# Patient Record
Sex: FEMALE | Race: WHITE | NOT HISPANIC OR LATINO | Employment: FULL TIME | URBAN - METROPOLITAN AREA
[De-identification: names, ages, dates, MRNs, and addresses within clinical notes are randomized per-mention and may not be internally consistent; named-entity substitution may affect disease eponyms.]

---

## 2017-01-13 ENCOUNTER — COMMUNICATION - HEALTHEAST (OUTPATIENT)
Dept: INTERNAL MEDICINE | Facility: CLINIC | Age: 72
End: 2017-01-13

## 2017-01-13 DIAGNOSIS — F32.A DEPRESSION: ICD-10-CM

## 2017-02-26 ENCOUNTER — COMMUNICATION - HEALTHEAST (OUTPATIENT)
Dept: INTERNAL MEDICINE | Facility: CLINIC | Age: 72
End: 2017-02-26

## 2017-05-25 ENCOUNTER — COMMUNICATION - HEALTHEAST (OUTPATIENT)
Dept: INTERNAL MEDICINE | Facility: CLINIC | Age: 72
End: 2017-05-25

## 2017-05-29 ENCOUNTER — COMMUNICATION - HEALTHEAST (OUTPATIENT)
Dept: INTERNAL MEDICINE | Facility: CLINIC | Age: 72
End: 2017-05-29

## 2017-06-06 ENCOUNTER — COMMUNICATION - HEALTHEAST (OUTPATIENT)
Dept: INTERNAL MEDICINE | Facility: CLINIC | Age: 72
End: 2017-06-06

## 2017-08-21 ENCOUNTER — RECORDS - HEALTHEAST (OUTPATIENT)
Dept: ADMINISTRATIVE | Facility: OTHER | Age: 72
End: 2017-08-21

## 2017-08-27 ENCOUNTER — COMMUNICATION - HEALTHEAST (OUTPATIENT)
Dept: INTERNAL MEDICINE | Facility: CLINIC | Age: 72
End: 2017-08-27

## 2017-09-19 ENCOUNTER — COMMUNICATION - HEALTHEAST (OUTPATIENT)
Dept: INTERNAL MEDICINE | Facility: CLINIC | Age: 72
End: 2017-09-19

## 2017-09-19 DIAGNOSIS — F32.A DEPRESSION: ICD-10-CM

## 2017-10-02 ENCOUNTER — AMBULATORY - HEALTHEAST (OUTPATIENT)
Dept: INTERNAL MEDICINE | Facility: CLINIC | Age: 72
End: 2017-10-02

## 2017-10-02 DIAGNOSIS — R06.09 DYSPNEA ON EXERTION: ICD-10-CM

## 2017-10-16 ENCOUNTER — COMMUNICATION - HEALTHEAST (OUTPATIENT)
Dept: INTERNAL MEDICINE | Facility: CLINIC | Age: 72
End: 2017-10-16

## 2017-10-16 ENCOUNTER — HOSPITAL ENCOUNTER (OUTPATIENT)
Dept: CARDIOLOGY | Facility: CLINIC | Age: 72
Discharge: HOME | End: 2017-10-16
Attending: INTERNAL MEDICINE

## 2017-10-16 DIAGNOSIS — R06.09 DYSPNEA ON EXERTION: ICD-10-CM

## 2017-10-16 DIAGNOSIS — R06.09 OTHER FORMS OF DYSPNEA: ICD-10-CM

## 2017-10-16 LAB
AORTIC ROOT: 2.8 CM
AR DECEL SLOPE: 1580 MM/S2
AR PEAK VELOCITY: 342 CM/S
AV REGURGITANT PEAK GRADIENT: 46.8 MMHG
AV REGURGITATION PRESSURE HALF TIME: 635 MS
BSA FOR ECHO PROCEDURE: 1.44 M2
CV BLOOD PRESSURE: NORMAL MMHG
CV ECHO HEIGHT: 57 IN
CV ECHO WEIGHT: 114 LBS
DOP CALC LVOT AREA: 2.83 CM2
DOP CALC LVOT DIAMETER: 1.9 CM
DOP CALC LVOT PEAK VEL: 112 CM/S
DOP CALC LVOT STROKE VOLUME: 61.8 CM3
DOP CALCLVOT PEAK VEL VTI: 21.8 CM
EJECTION FRACTION: 62 % (ref 55–75)
FRACTIONAL SHORTENING: 37.1 % (ref 28–44)
INTERVENTRICULAR SEPTUM IN END DIASTOLE: 1.2 CM (ref 0.6–0.9)
IVS/PW RATIO: 1.2
LA AREA 1: 16 CM2
LA AREA 2: 13.4 CM2
LEFT ATRIUM LENGTH: 4.26 CM
LEFT ATRIUM SIZE: 2.8 CM
LEFT ATRIUM TO AORTIC ROOT RATIO: 1 NO UNITS
LEFT ATRIUM VOLUME INDEX: 29.7 ML/M2
LEFT ATRIUM VOLUME: 42.8 CM3
LEFT VENTRICLE CARDIAC INDEX: 2.7 L/MIN/M2
LEFT VENTRICLE CARDIAC OUTPUT: 3.8 L/MIN
LEFT VENTRICLE DIASTOLIC VOLUME INDEX: 38.2 CM3/M2 (ref 34–74)
LEFT VENTRICLE DIASTOLIC VOLUME: 55 CM3 (ref 46–106)
LEFT VENTRICLE HEART RATE: 62 BPM
LEFT VENTRICLE MASS INDEX: 82.6 G/M2
LEFT VENTRICLE SYSTOLIC VOLUME INDEX: 14.6 CM3/M2 (ref 11–31)
LEFT VENTRICLE SYSTOLIC VOLUME: 21 CM3 (ref 14–42)
LEFT VENTRICULAR INTERNAL DIMENSION IN DIASTOLE: 3.5 CM (ref 3.8–5.2)
LEFT VENTRICULAR INTERNAL DIMENSION IN SYSTOLE: 2.2 CM (ref 2.2–3.5)
LEFT VENTRICULAR MASS: 119 G
LEFT VENTRICULAR OUTFLOW TRACT MEAN GRADIENT: 2 MMHG
LEFT VENTRICULAR OUTFLOW TRACT MEAN VELOCITY: 65.3 CM/S
LEFT VENTRICULAR OUTFLOW TRACT PEAK GRADIENT: 5 MMHG
LEFT VENTRICULAR POSTERIOR WALL IN END DIASTOLE: 1 CM (ref 0.6–0.9)
LV STROKE VOLUME INDEX: 42.9 ML/M2
MITRAL VALVE E/A RATIO: 0.7
MV AVERAGE E/E' RATIO: 9.2 CM/S
MV DECELERATION TIME: 197 MS
MV E'TISSUE VEL-LAT: 6.92 CM/S
MV E'TISSUE VEL-MED: 8.29 CM/S
MV LATERAL E/E' RATIO: 10.1
MV MEDIAL E/E' RATIO: 8.4
MV PEAK A VELOCITY: 107 CM/S
MV PEAK E VELOCITY: 69.6 CM/S
NUC REST DIASTOLIC VOLUME INDEX: 1824 LBS
NUC REST SYSTOLIC VOLUME INDEX: 57 IN
RIGHT VENTRICULAR INTERNAL DIMENSION IN DYSTOLE: 2.4 CM
TRICUSPID REGURGITATION PEAK PRESSURE GRADIENT: 16.6 MMHG
TRICUSPID VALVE ANULAR PLANE SYSTOLIC EXCURSION: 1.9 CM
TRICUSPID VALVE PEAK REGURGITANT VELOCITY: 204 CM/S

## 2017-10-16 ASSESSMENT — MIFFLIN-ST. JEOR: SCORE: 885.98

## 2017-10-22 ENCOUNTER — COMMUNICATION - HEALTHEAST (OUTPATIENT)
Dept: INTERNAL MEDICINE | Facility: CLINIC | Age: 72
End: 2017-10-22

## 2017-10-27 ENCOUNTER — OFFICE VISIT - HEALTHEAST (OUTPATIENT)
Dept: FAMILY MEDICINE | Facility: CLINIC | Age: 72
End: 2017-10-27

## 2017-10-27 DIAGNOSIS — M25.561 ACUTE PAIN OF RIGHT KNEE: ICD-10-CM

## 2017-10-28 ENCOUNTER — COMMUNICATION - HEALTHEAST (OUTPATIENT)
Dept: SCHEDULING | Facility: CLINIC | Age: 72
End: 2017-10-28

## 2017-11-12 ENCOUNTER — COMMUNICATION - HEALTHEAST (OUTPATIENT)
Dept: INTERNAL MEDICINE | Facility: CLINIC | Age: 72
End: 2017-11-12

## 2017-11-12 DIAGNOSIS — F32.A DEPRESSION: ICD-10-CM

## 2017-11-13 ENCOUNTER — COMMUNICATION - HEALTHEAST (OUTPATIENT)
Dept: INTERNAL MEDICINE | Facility: CLINIC | Age: 72
End: 2017-11-13

## 2017-12-04 ENCOUNTER — RECORDS - HEALTHEAST (OUTPATIENT)
Dept: ADMINISTRATIVE | Facility: OTHER | Age: 72
End: 2017-12-04

## 2017-12-05 ENCOUNTER — RECORDS - HEALTHEAST (OUTPATIENT)
Dept: ADMINISTRATIVE | Facility: OTHER | Age: 72
End: 2017-12-05
Payer: MEDICARE

## 2017-12-26 ENCOUNTER — RECORDS - HEALTHEAST (OUTPATIENT)
Dept: ADMINISTRATIVE | Facility: OTHER | Age: 72
End: 2017-12-26

## 2018-01-11 ENCOUNTER — COMMUNICATION - HEALTHEAST (OUTPATIENT)
Dept: INTERNAL MEDICINE | Facility: CLINIC | Age: 73
End: 2018-01-11

## 2018-01-17 ENCOUNTER — COMMUNICATION - HEALTHEAST (OUTPATIENT)
Dept: SCHEDULING | Facility: CLINIC | Age: 73
End: 2018-01-17

## 2018-01-18 ENCOUNTER — OFFICE VISIT - HEALTHEAST (OUTPATIENT)
Dept: INTERNAL MEDICINE | Facility: CLINIC | Age: 73
End: 2018-01-18

## 2018-01-18 DIAGNOSIS — F32.A DEPRESSED: ICD-10-CM

## 2018-01-18 DIAGNOSIS — I10 ESSENTIAL HYPERTENSION: ICD-10-CM

## 2018-01-18 DIAGNOSIS — Z12.11 SCREEN FOR COLON CANCER: ICD-10-CM

## 2018-01-18 DIAGNOSIS — R05.9 COUGH: ICD-10-CM

## 2018-01-18 DIAGNOSIS — Z12.31 VISIT FOR SCREENING MAMMOGRAM: ICD-10-CM

## 2018-01-18 DIAGNOSIS — J06.9 URI (UPPER RESPIRATORY INFECTION): ICD-10-CM

## 2018-01-18 DIAGNOSIS — F41.9 ANXIETY: ICD-10-CM

## 2018-01-18 ASSESSMENT — MIFFLIN-ST. JEOR: SCORE: 958.56

## 2018-01-27 ENCOUNTER — COMMUNICATION - HEALTHEAST (OUTPATIENT)
Dept: INTERNAL MEDICINE | Facility: CLINIC | Age: 73
End: 2018-01-27

## 2018-02-08 ENCOUNTER — RECORDS - HEALTHEAST (OUTPATIENT)
Dept: ADMINISTRATIVE | Facility: OTHER | Age: 73
End: 2018-02-08

## 2018-02-08 ENCOUNTER — OFFICE VISIT - HEALTHEAST (OUTPATIENT)
Dept: INTERNAL MEDICINE | Facility: CLINIC | Age: 73
End: 2018-02-08

## 2018-02-08 ENCOUNTER — HOSPITAL ENCOUNTER (OUTPATIENT)
Dept: CT IMAGING | Facility: CLINIC | Age: 73
Discharge: HOME | End: 2018-02-08
Attending: INTERNAL MEDICINE

## 2018-02-08 DIAGNOSIS — S30.1XXA: ICD-10-CM

## 2018-02-08 DIAGNOSIS — K46.9 ABDOMINAL HERNIA: ICD-10-CM

## 2018-02-08 DIAGNOSIS — Z12.39 SCREENING BREAST EXAMINATION: ICD-10-CM

## 2018-02-08 DIAGNOSIS — Z12.11 SCREEN FOR COLON CANCER: ICD-10-CM

## 2018-02-08 LAB
ALBUMIN SERPL-MCNC: 3.4 G/DL (ref 3.5–5)
ALP SERPL-CCNC: 51 U/L (ref 45–120)
ALT SERPL W P-5'-P-CCNC: 12 U/L (ref 0–45)
ANION GAP SERPL CALCULATED.3IONS-SCNC: 8 MMOL/L (ref 5–18)
AST SERPL W P-5'-P-CCNC: 18 U/L (ref 0–40)
BILIRUB SERPL-MCNC: 0.5 MG/DL (ref 0–1)
BUN SERPL-MCNC: 22 MG/DL (ref 8–28)
CALCIUM SERPL-MCNC: 10.4 MG/DL (ref 8.5–10.5)
CHLORIDE BLD-SCNC: 104 MMOL/L (ref 98–107)
CO2 SERPL-SCNC: 27 MMOL/L (ref 22–31)
CREAT SERPL-MCNC: 1.08 MG/DL (ref 0.6–1.1)
ERYTHROCYTE [DISTWIDTH] IN BLOOD BY AUTOMATED COUNT: 13.5 % (ref 11–14.5)
GFR SERPL CREATININE-BSD FRML MDRD: 50 ML/MIN/1.73M2
GLUCOSE BLD-MCNC: 79 MG/DL (ref 70–125)
HCT VFR BLD AUTO: 30.7 % (ref 35–47)
HGB BLD-MCNC: 10 G/DL (ref 12–16)
MCH RBC QN AUTO: 31.9 PG (ref 27–34)
MCHC RBC AUTO-ENTMCNC: 32.6 G/DL (ref 32–36)
MCV RBC AUTO: 98 FL (ref 80–100)
PLATELET # BLD AUTO: 214 THOU/UL (ref 140–440)
PMV BLD AUTO: 9.8 FL (ref 8.5–12.5)
POTASSIUM BLD-SCNC: 3.8 MMOL/L (ref 3.5–5)
PROT SERPL-MCNC: 6.1 G/DL (ref 6–8)
RBC # BLD AUTO: 3.13 MILL/UL (ref 3.8–5.4)
SODIUM SERPL-SCNC: 139 MMOL/L (ref 136–145)
WBC: 5.5 THOU/UL (ref 4–11)

## 2018-02-08 ASSESSMENT — MIFFLIN-ST. JEOR: SCORE: 985.77

## 2018-03-09 ENCOUNTER — COMMUNICATION - HEALTHEAST (OUTPATIENT)
Dept: INTERNAL MEDICINE | Facility: CLINIC | Age: 73
End: 2018-03-09

## 2018-05-14 ENCOUNTER — COMMUNICATION - HEALTHEAST (OUTPATIENT)
Dept: INTERNAL MEDICINE | Facility: CLINIC | Age: 73
End: 2018-05-14

## 2018-05-14 DIAGNOSIS — F32.A DEPRESSED: ICD-10-CM

## 2018-05-30 ENCOUNTER — COMMUNICATION - HEALTHEAST (OUTPATIENT)
Dept: INTERNAL MEDICINE | Facility: CLINIC | Age: 73
End: 2018-05-30

## 2018-06-23 ENCOUNTER — COMMUNICATION - HEALTHEAST (OUTPATIENT)
Dept: INTERNAL MEDICINE | Facility: CLINIC | Age: 73
End: 2018-06-23

## 2018-07-23 ENCOUNTER — COMMUNICATION - HEALTHEAST (OUTPATIENT)
Dept: SCHEDULING | Facility: CLINIC | Age: 73
End: 2018-07-23

## 2018-09-17 ENCOUNTER — COMMUNICATION - HEALTHEAST (OUTPATIENT)
Dept: INTERNAL MEDICINE | Facility: CLINIC | Age: 73
End: 2018-09-17

## 2018-09-23 ENCOUNTER — COMMUNICATION - HEALTHEAST (OUTPATIENT)
Dept: INTERNAL MEDICINE | Facility: CLINIC | Age: 73
End: 2018-09-23

## 2018-10-20 ENCOUNTER — COMMUNICATION - HEALTHEAST (OUTPATIENT)
Dept: INTERNAL MEDICINE | Facility: CLINIC | Age: 73
End: 2018-10-20

## 2018-10-22 ENCOUNTER — COMMUNICATION - HEALTHEAST (OUTPATIENT)
Dept: INTERNAL MEDICINE | Facility: CLINIC | Age: 73
End: 2018-10-22

## 2018-11-21 ENCOUNTER — RECORDS - HEALTHEAST (OUTPATIENT)
Dept: ADMINISTRATIVE | Facility: OTHER | Age: 73
End: 2018-11-21

## 2018-12-23 ENCOUNTER — COMMUNICATION - HEALTHEAST (OUTPATIENT)
Dept: INTERNAL MEDICINE | Facility: CLINIC | Age: 73
End: 2018-12-23

## 2018-12-25 ENCOUNTER — COMMUNICATION - HEALTHEAST (OUTPATIENT)
Dept: INTERNAL MEDICINE | Facility: CLINIC | Age: 73
End: 2018-12-25

## 2018-12-25 DIAGNOSIS — I10 ESSENTIAL HYPERTENSION: ICD-10-CM

## 2019-01-13 ENCOUNTER — COMMUNICATION - HEALTHEAST (OUTPATIENT)
Dept: INTERNAL MEDICINE | Facility: CLINIC | Age: 74
End: 2019-01-13

## 2019-01-14 ENCOUNTER — COMMUNICATION - HEALTHEAST (OUTPATIENT)
Dept: INTERNAL MEDICINE | Facility: CLINIC | Age: 74
End: 2019-01-14

## 2019-01-14 DIAGNOSIS — I10 ESSENTIAL HYPERTENSION: ICD-10-CM

## 2019-02-14 ENCOUNTER — COMMUNICATION - HEALTHEAST (OUTPATIENT)
Dept: SCHEDULING | Facility: CLINIC | Age: 74
End: 2019-02-14

## 2019-02-14 ENCOUNTER — OFFICE VISIT - HEALTHEAST (OUTPATIENT)
Dept: INTERNAL MEDICINE | Facility: CLINIC | Age: 74
End: 2019-02-14

## 2019-02-14 DIAGNOSIS — M50.30 DDD (DEGENERATIVE DISC DISEASE), CERVICAL: ICD-10-CM

## 2019-02-14 ASSESSMENT — MIFFLIN-ST. JEOR: SCORE: 1012.99

## 2019-02-22 ENCOUNTER — COMMUNICATION - HEALTHEAST (OUTPATIENT)
Dept: INTERNAL MEDICINE | Facility: CLINIC | Age: 74
End: 2019-02-22

## 2019-02-22 DIAGNOSIS — F32.A DEPRESSION: ICD-10-CM

## 2019-03-04 ENCOUNTER — COMMUNICATION - HEALTHEAST (OUTPATIENT)
Dept: INTERNAL MEDICINE | Facility: CLINIC | Age: 74
End: 2019-03-04

## 2019-03-18 ENCOUNTER — COMMUNICATION - HEALTHEAST (OUTPATIENT)
Dept: INTERNAL MEDICINE | Facility: CLINIC | Age: 74
End: 2019-03-18

## 2019-03-18 DIAGNOSIS — M85.80 OSTEOPENIA: ICD-10-CM

## 2019-04-26 ENCOUNTER — COMMUNICATION - HEALTHEAST (OUTPATIENT)
Dept: INTERNAL MEDICINE | Facility: CLINIC | Age: 74
End: 2019-04-26

## 2019-04-26 DIAGNOSIS — I10 ESSENTIAL HYPERTENSION: ICD-10-CM

## 2019-04-28 ENCOUNTER — COMMUNICATION - HEALTHEAST (OUTPATIENT)
Dept: INTERNAL MEDICINE | Facility: CLINIC | Age: 74
End: 2019-04-28

## 2019-04-28 DIAGNOSIS — F41.9 ANXIETY: ICD-10-CM

## 2019-05-17 ENCOUNTER — COMMUNICATION - HEALTHEAST (OUTPATIENT)
Dept: INTERNAL MEDICINE | Facility: CLINIC | Age: 74
End: 2019-05-17

## 2019-05-17 DIAGNOSIS — F32.A DEPRESSED: ICD-10-CM

## 2019-05-18 ENCOUNTER — COMMUNICATION - HEALTHEAST (OUTPATIENT)
Dept: INTERNAL MEDICINE | Facility: CLINIC | Age: 74
End: 2019-05-18

## 2019-05-18 DIAGNOSIS — F32.A DEPRESSION: ICD-10-CM

## 2019-06-17 ENCOUNTER — COMMUNICATION - HEALTHEAST (OUTPATIENT)
Dept: INTERNAL MEDICINE | Facility: CLINIC | Age: 74
End: 2019-06-17

## 2019-06-17 DIAGNOSIS — M85.80 OSTEOPENIA: ICD-10-CM

## 2019-06-17 DIAGNOSIS — F41.9 ANXIETY: ICD-10-CM

## 2019-08-02 ENCOUNTER — COMMUNICATION - HEALTHEAST (OUTPATIENT)
Dept: INTERNAL MEDICINE | Facility: CLINIC | Age: 74
End: 2019-08-02

## 2019-08-02 DIAGNOSIS — F41.1 GAD (GENERALIZED ANXIETY DISORDER): ICD-10-CM

## 2019-08-17 ENCOUNTER — COMMUNICATION - HEALTHEAST (OUTPATIENT)
Dept: INTERNAL MEDICINE | Facility: CLINIC | Age: 74
End: 2019-08-17

## 2019-08-17 DIAGNOSIS — F32.A DEPRESSED: ICD-10-CM

## 2019-09-14 ENCOUNTER — COMMUNICATION - HEALTHEAST (OUTPATIENT)
Dept: INTERNAL MEDICINE | Facility: CLINIC | Age: 74
End: 2019-09-14

## 2019-09-14 DIAGNOSIS — M85.80 OSTEOPENIA: ICD-10-CM

## 2019-09-18 ENCOUNTER — COMMUNICATION - HEALTHEAST (OUTPATIENT)
Dept: INTERNAL MEDICINE | Facility: CLINIC | Age: 74
End: 2019-09-18

## 2019-09-18 DIAGNOSIS — F41.9 ANXIETY: ICD-10-CM

## 2019-10-22 ENCOUNTER — COMMUNICATION - HEALTHEAST (OUTPATIENT)
Dept: INTERNAL MEDICINE | Facility: CLINIC | Age: 74
End: 2019-10-22

## 2019-10-25 ENCOUNTER — OFFICE VISIT - HEALTHEAST (OUTPATIENT)
Dept: INTERNAL MEDICINE | Facility: CLINIC | Age: 74
End: 2019-10-25

## 2019-10-25 ENCOUNTER — COMMUNICATION - HEALTHEAST (OUTPATIENT)
Dept: INTERNAL MEDICINE | Facility: CLINIC | Age: 74
End: 2019-10-25

## 2019-10-25 DIAGNOSIS — I10 ESSENTIAL HYPERTENSION: ICD-10-CM

## 2019-10-25 DIAGNOSIS — R41.3 MEMORY LOSS: ICD-10-CM

## 2019-10-25 DIAGNOSIS — F33.9 RECURRENT MAJOR DEPRESSIVE DISORDER, REMISSION STATUS UNSPECIFIED (H): ICD-10-CM

## 2019-10-25 DIAGNOSIS — M81.0 OSTEOPOROSIS OF VERTEBRA: ICD-10-CM

## 2019-10-25 DIAGNOSIS — F41.9 ANXIETY: ICD-10-CM

## 2019-10-25 DIAGNOSIS — Z00.00 WELLNESS EXAMINATION: ICD-10-CM

## 2019-10-25 ASSESSMENT — MIFFLIN-ST. JEOR: SCORE: 1012.99

## 2019-10-25 ASSESSMENT — PATIENT HEALTH QUESTIONNAIRE - PHQ9: SUM OF ALL RESPONSES TO PHQ QUESTIONS 1-9: 15

## 2019-11-14 ENCOUNTER — COMMUNICATION - HEALTHEAST (OUTPATIENT)
Dept: INTERNAL MEDICINE | Facility: CLINIC | Age: 74
End: 2019-11-14

## 2019-11-14 DIAGNOSIS — F32.A DEPRESSED: ICD-10-CM

## 2019-11-17 ENCOUNTER — COMMUNICATION - HEALTHEAST (OUTPATIENT)
Dept: INTERNAL MEDICINE | Facility: CLINIC | Age: 74
End: 2019-11-17

## 2019-11-17 DIAGNOSIS — F41.9 ANXIETY: ICD-10-CM

## 2020-01-19 ENCOUNTER — COMMUNICATION - HEALTHEAST (OUTPATIENT)
Dept: INTERNAL MEDICINE | Facility: CLINIC | Age: 75
End: 2020-01-19

## 2020-01-19 DIAGNOSIS — F41.9 ANXIETY: ICD-10-CM

## 2020-03-09 ENCOUNTER — COMMUNICATION - HEALTHEAST (OUTPATIENT)
Dept: INTERNAL MEDICINE | Facility: CLINIC | Age: 75
End: 2020-03-09

## 2020-03-09 DIAGNOSIS — F41.9 ANXIETY: ICD-10-CM

## 2020-04-10 ENCOUNTER — NURSE TRIAGE (OUTPATIENT)
Dept: NURSING | Facility: CLINIC | Age: 75
End: 2020-04-10

## 2020-04-11 NOTE — TELEPHONE ENCOUNTER
Smitha calls and says that she woke up today around 6:30 am and had diarrhea. Pt. Says that she had vomiting, too. Vomiting stopped around 11 am, but diarrhea continues. When RN was triaging pt's symptom of diarrhea, Smitha says that when she was on the toilet today, she fainted and her  helped her off of the floor. Pt. Says that she has a black left eye, a bump on her head, and a scratch above her right eye, from the fall. Because pt. Fell and lost consciousness today, RN advised pt. To call 911. Pt. Says that she is not going to do this and is not sure if she will go to an ER. Pt. Says that she will call back if she decides to go to an ER tonight. COVID 19 Nurse Triage Plan/Patient Instructions    Please be aware that novel coronavirus (COVID-19) may be circulating in the community. If you develop symptoms such as fever, cough, or SOB or if you have concerns about the presence of another infection including coronavirus (COVID-19), please contact your health care provider or visit [email or web address].     Disposition/Instructions    Patient to stay at home and follow home care protocol based instructions.     Thank you for limiting contact with others, wearing a simple mask to cover your cough, practice good hand hygiene habits and accessing our virtual services where possible to limit the spread of this virus.    For more information about COVID19 and options for caring for yourself at home, please visit the CDC website at https://www.cdc.gov/coronavirus/2019-ncov/about/steps-when-sick.html  For more options for care at [facility], please visit our website at [email or web address]    For more information, please use the [place] Department of Health ([facility]) COVID-19 Hotlines (Interpreters available):     Health questions: Phone Number: [phone] or [phone] and Hours: 7 a.m. to 7 p.m.    Schools and  questions: Phone Number: [phone] or [phone] and  Hours 7 a.m. to 7 p.m.                Reason for Disposition    [1] SEVERE diarrhea (e.g., 7 or more times / day more than normal) AND [2]  age > 60 years    Additional Information    Negative: Shock suspected (e.g., cold/pale/clammy skin, too weak to stand, low BP, rapid pulse)    Negative: Difficult to awaken or acting confused (e.g., disoriented, slurred speech)    Negative: Sounds like a life-threatening emergency to the triager    Negative: Vomiting also present and worse than the diarrhea    Negative: [1] Blood in stool AND [2] without diarrhea    Negative: Diarrhea in a cancer patient who is currently (or recently) receiving chemotherapy or radiation therapy, or cancer patient who has metastatic or end-stage cancer and is receiving palliative care    Negative: [1] SEVERE abdominal pain (e.g., excruciating) AND [2] present > 1 hour    Negative: [1] SEVERE abdominal pain AND [2] age > 60    Negative: [1] Blood in the stool AND [2] moderate or large amount of blood    Negative: Black or tarry bowel movements  (Exception: chronic-unchanged  black-grey bowel movements AND is taking iron pills or Pepto-bismol)    Negative: [1] Drinking very little AND [2] dehydration suspected (e.g., no urine > 12 hours, very dry mouth, very lightheaded)    Negative: Patient sounds very sick or weak to the triager    Protocols used: DIARRHEA-A-

## 2020-04-22 ENCOUNTER — COMMUNICATION - HEALTHEAST (OUTPATIENT)
Dept: INTERNAL MEDICINE | Facility: CLINIC | Age: 75
End: 2020-04-22

## 2020-04-22 DIAGNOSIS — F41.9 ANXIETY: ICD-10-CM

## 2020-04-22 DIAGNOSIS — F32.A DEPRESSION: ICD-10-CM

## 2020-04-23 ENCOUNTER — COMMUNICATION - HEALTHEAST (OUTPATIENT)
Dept: SCHEDULING | Facility: CLINIC | Age: 75
End: 2020-04-23

## 2020-05-28 ENCOUNTER — COMMUNICATION - HEALTHEAST (OUTPATIENT)
Dept: INTERNAL MEDICINE | Facility: CLINIC | Age: 75
End: 2020-05-28

## 2020-05-28 DIAGNOSIS — F41.9 ANXIETY: ICD-10-CM

## 2020-08-01 ENCOUNTER — COMMUNICATION - HEALTHEAST (OUTPATIENT)
Dept: INTERNAL MEDICINE | Facility: CLINIC | Age: 75
End: 2020-08-01

## 2020-08-01 DIAGNOSIS — F41.9 ANXIETY: ICD-10-CM

## 2020-09-12 ENCOUNTER — COMMUNICATION - HEALTHEAST (OUTPATIENT)
Dept: INTERNAL MEDICINE | Facility: CLINIC | Age: 75
End: 2020-09-12

## 2020-09-12 DIAGNOSIS — F41.9 ANXIETY: ICD-10-CM

## 2020-10-10 ENCOUNTER — COMMUNICATION - HEALTHEAST (OUTPATIENT)
Dept: INTERNAL MEDICINE | Facility: CLINIC | Age: 75
End: 2020-10-10

## 2020-10-10 DIAGNOSIS — F41.9 ANXIETY: ICD-10-CM

## 2020-10-12 ENCOUNTER — COMMUNICATION - HEALTHEAST (OUTPATIENT)
Dept: INTERNAL MEDICINE | Facility: CLINIC | Age: 75
End: 2020-10-12

## 2020-10-12 DIAGNOSIS — F41.9 ANXIETY: ICD-10-CM

## 2020-10-15 ENCOUNTER — COMMUNICATION - HEALTHEAST (OUTPATIENT)
Dept: SCHEDULING | Facility: CLINIC | Age: 75
End: 2020-10-15

## 2020-10-16 ENCOUNTER — OFFICE VISIT - HEALTHEAST (OUTPATIENT)
Dept: INTERNAL MEDICINE | Facility: CLINIC | Age: 75
End: 2020-10-16

## 2020-10-16 DIAGNOSIS — N28.9 DISORDER OF KIDNEY AND URETER: ICD-10-CM

## 2020-10-16 DIAGNOSIS — I10 ESSENTIAL HYPERTENSION: ICD-10-CM

## 2020-10-16 DIAGNOSIS — G89.29 CHRONIC RIGHT SHOULDER PAIN: ICD-10-CM

## 2020-10-16 DIAGNOSIS — R42 DIZZINESS: ICD-10-CM

## 2020-10-16 DIAGNOSIS — F41.9 ANXIETY: ICD-10-CM

## 2020-10-16 DIAGNOSIS — M25.511 CHRONIC RIGHT SHOULDER PAIN: ICD-10-CM

## 2020-10-16 DIAGNOSIS — M54.2 NECK PAIN: ICD-10-CM

## 2020-10-16 LAB
ALBUMIN SERPL-MCNC: 3.7 G/DL (ref 3.5–5)
ALP SERPL-CCNC: 64 U/L (ref 45–120)
ALT SERPL W P-5'-P-CCNC: 15 U/L (ref 0–45)
ANION GAP SERPL CALCULATED.3IONS-SCNC: 8 MMOL/L (ref 5–18)
AST SERPL W P-5'-P-CCNC: 16 U/L (ref 0–40)
BILIRUB SERPL-MCNC: 0.5 MG/DL (ref 0–1)
BUN SERPL-MCNC: 24 MG/DL (ref 8–28)
CALCIUM SERPL-MCNC: 9.8 MG/DL (ref 8.5–10.5)
CHLORIDE BLD-SCNC: 104 MMOL/L (ref 98–107)
CHOLEST SERPL-MCNC: 170 MG/DL
CO2 SERPL-SCNC: 28 MMOL/L (ref 22–31)
CREAT SERPL-MCNC: 1.16 MG/DL (ref 0.6–1.1)
ERYTHROCYTE [DISTWIDTH] IN BLOOD BY AUTOMATED COUNT: 11.5 % (ref 11–14.5)
FASTING STATUS PATIENT QL REPORTED: YES
GFR SERPL CREATININE-BSD FRML MDRD: 46 ML/MIN/1.73M2
GLUCOSE BLD-MCNC: 77 MG/DL (ref 70–125)
HCT VFR BLD AUTO: 39.1 % (ref 35–47)
HDLC SERPL-MCNC: 60 MG/DL
HGB BLD-MCNC: 13.3 G/DL (ref 12–16)
LDLC SERPL CALC-MCNC: 93 MG/DL
MCH RBC QN AUTO: 32.9 PG (ref 27–34)
MCHC RBC AUTO-ENTMCNC: 34.1 G/DL (ref 32–36)
MCV RBC AUTO: 96 FL (ref 80–100)
PLATELET # BLD AUTO: 205 THOU/UL (ref 140–440)
PMV BLD AUTO: 7.4 FL (ref 7–10)
POTASSIUM BLD-SCNC: 4.1 MMOL/L (ref 3.5–5)
PROT SERPL-MCNC: 6 G/DL (ref 6–8)
RBC # BLD AUTO: 4.05 MILL/UL (ref 3.8–5.4)
SODIUM SERPL-SCNC: 140 MMOL/L (ref 136–145)
TRIGL SERPL-MCNC: 84 MG/DL
TSH SERPL DL<=0.005 MIU/L-ACNC: 2.76 UIU/ML (ref 0.3–5)
VIT B12 SERPL-MCNC: 1574 PG/ML (ref 213–816)
WBC: 5.9 THOU/UL (ref 4–11)

## 2020-10-16 ASSESSMENT — MIFFLIN-ST. JEOR: SCORE: 876.91

## 2020-10-19 ENCOUNTER — COMMUNICATION - HEALTHEAST (OUTPATIENT)
Dept: INTERNAL MEDICINE | Facility: CLINIC | Age: 75
End: 2020-10-19

## 2020-10-19 DIAGNOSIS — I10 ESSENTIAL HYPERTENSION: ICD-10-CM

## 2020-11-06 ENCOUNTER — RECORDS - HEALTHEAST (OUTPATIENT)
Dept: ADMINISTRATIVE | Facility: OTHER | Age: 75
End: 2020-11-06

## 2020-11-18 ENCOUNTER — COMMUNICATION - HEALTHEAST (OUTPATIENT)
Dept: INTERNAL MEDICINE | Facility: CLINIC | Age: 75
End: 2020-11-18

## 2020-11-18 DIAGNOSIS — I10 ESSENTIAL HYPERTENSION: ICD-10-CM

## 2020-12-01 ENCOUNTER — OFFICE VISIT - HEALTHEAST (OUTPATIENT)
Dept: INTERNAL MEDICINE | Facility: CLINIC | Age: 75
End: 2020-12-01

## 2020-12-01 DIAGNOSIS — F33.9 RECURRENT MAJOR DEPRESSIVE DISORDER, REMISSION STATUS UNSPECIFIED (H): ICD-10-CM

## 2020-12-01 DIAGNOSIS — Z00.00 WELLNESS EXAMINATION: ICD-10-CM

## 2020-12-01 DIAGNOSIS — R42 DIZZINESS: ICD-10-CM

## 2020-12-01 DIAGNOSIS — F41.9 ANXIETY: ICD-10-CM

## 2020-12-01 DIAGNOSIS — Z12.31 VISIT FOR SCREENING MAMMOGRAM: ICD-10-CM

## 2020-12-01 DIAGNOSIS — R41.3 MEMORY LOSS: ICD-10-CM

## 2020-12-01 ASSESSMENT — MIFFLIN-ST. JEOR: SCORE: 881.44

## 2020-12-01 ASSESSMENT — PATIENT HEALTH QUESTIONNAIRE - PHQ9: SUM OF ALL RESPONSES TO PHQ QUESTIONS 1-9: 7

## 2021-01-28 ENCOUNTER — COMMUNICATION - HEALTHEAST (OUTPATIENT)
Dept: INTERNAL MEDICINE | Facility: CLINIC | Age: 76
End: 2021-01-28

## 2021-01-28 DIAGNOSIS — F41.9 ANXIETY: ICD-10-CM

## 2021-02-19 ENCOUNTER — COMMUNICATION - HEALTHEAST (OUTPATIENT)
Dept: INTERNAL MEDICINE | Facility: CLINIC | Age: 76
End: 2021-02-19

## 2021-02-19 DIAGNOSIS — I10 ESSENTIAL HYPERTENSION: ICD-10-CM

## 2021-03-07 ENCOUNTER — COMMUNICATION - HEALTHEAST (OUTPATIENT)
Dept: INTERNAL MEDICINE | Facility: CLINIC | Age: 76
End: 2021-03-07

## 2021-03-07 DIAGNOSIS — F41.9 ANXIETY: ICD-10-CM

## 2021-03-10 ENCOUNTER — COMMUNICATION - HEALTHEAST (OUTPATIENT)
Dept: INTERNAL MEDICINE | Facility: CLINIC | Age: 76
End: 2021-03-10

## 2021-05-24 ENCOUNTER — ANCILLARY PROCEDURE (OUTPATIENT)
Dept: GENERAL RADIOLOGY | Facility: CLINIC | Age: 76
End: 2021-05-24
Attending: PHYSICIAN ASSISTANT
Payer: MEDICARE

## 2021-05-24 ENCOUNTER — OFFICE VISIT (OUTPATIENT)
Dept: URGENT CARE | Facility: URGENT CARE | Age: 76
End: 2021-05-24
Payer: MEDICARE

## 2021-05-24 VITALS
SYSTOLIC BLOOD PRESSURE: 148 MMHG | TEMPERATURE: 97.5 F | OXYGEN SATURATION: 99 % | WEIGHT: 112 LBS | DIASTOLIC BLOOD PRESSURE: 78 MMHG | HEART RATE: 75 BPM

## 2021-05-24 DIAGNOSIS — S00.12XA BLACK EYE OF LEFT SIDE, INITIAL ENCOUNTER: ICD-10-CM

## 2021-05-24 DIAGNOSIS — S69.92XA WRIST INJURY, LEFT, INITIAL ENCOUNTER: ICD-10-CM
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
## 2021-05-24 PROCEDURE — 73110 X-RAY EXAM OF WRIST: CPT | Mod: LT | Performed by: RADIOLOGY

## 2021-05-24 PROCEDURE — 70200 X-RAY EXAM OF EYE SOCKETS: CPT | Mod: 59 | Performed by: RADIOLOGY

## 2021-05-24 PROCEDURE — 99204 OFFICE O/P NEW MOD 45 MIN: CPT | Performed by: PHYSICIAN ASSISTANT

## 2021-05-24 RX ORDER — BUPROPION HYDROCHLORIDE 150 MG/1
150 TABLET ORAL
COMMUNITY
Start: 2020-12-01

## 2021-05-24 RX ORDER — ASPIRIN 81 MG/1
81 TABLET, CHEWABLE ORAL
COMMUNITY

## 2021-05-24 RX ORDER — LOSARTAN POTASSIUM 25 MG/1
25 TABLET ORAL
COMMUNITY
Start: 2021-02-19

## 2021-05-24 RX ORDER — CLONAZEPAM 0.5 MG/1
0.5 TABLET ORAL
COMMUNITY
Start: 2019-06-17

## 2021-05-24 NOTE — PROGRESS NOTES
SUBJECTIVE:  Chief Complaint   Patient presents with     Urgent Care     Fall     Pt tripped at home and injured left wrist, left eye and right knee.      Mariya Sorensen is a 76 year old female presents with a chief complaint of left wrist, right knee and left eye injury  The injury occurred 1 day(s) ago.   The injury happened while at home. How: coming out of laundry room and tripped over a boot tray and fell on hard floor.  Tried to break fall with her left wrist and left eye hit the floor.  Also hit her right knee but no real pain in knee  Left wrist is sore and painful to move.   has had decreased ROM.  Pain exacerbated by twisting and flexion/extension.  Relieved by rest.  She treated it initially with ice. This is the first time this type of injury has occurred to this patient.     Fell and hit left eye and now black and blue    She is not on any blood thinners.  Does have a mild HA, but no dizziness, vision is fine.  Denies any LOC or balance issues.  No ringing in ears  Neck does not hurt to move.  Able to open mouth fully and no oral trauma noted.  Did not have bloody nose    Past Medical History:   Diagnosis Date     CKD (chronic kidney disease) stage 3, GFR 30-59 ml/min      Hypertension      Major depression      Osteoarthritis      Osteoporosis      Current Outpatient Medications   Medication Sig Dispense Refill     aflibercept (EYLEA) 2 MG/0.05ML SOLN injection vial 2 mg by Intravitreal route       aspirin (ASA) 81 MG chewable tablet Take 81 mg by mouth       buPROPion (WELLBUTRIN XL) 150 MG 24 hr tablet Take 150 mg by mouth       clonazePAM (KLONOPIN) 0.5 MG tablet Take 0.5 mg by mouth       denosumab (PROLIA) 60 MG/ML SOSY injection Inject 60 mg Subcutaneous       FLUoxetine (PROZAC) 20 MG capsule Take 40 mg by mouth       losartan (COZAAR) 25 MG tablet Take 25 mg by mouth       vitamin B-12 (CYANOCOBALAMIN) 1000 MCG tablet Take 1,000 mcg by mouth       alendronate (FOSAMAX) 70 MG tablet Take 1  tablet by mouth every 7 days. Take with over 8 ounces water and stay upright for at least 30 minutes after dose.  Take at least 60 minutes before breakfast       Social History     Tobacco Use     Smoking status: Never Smoker     Smokeless tobacco: Never Used   Substance Use Topics     Alcohol use: Not on file       ROS:  Review of systems negative except as stated above.    EXAM:   BP (!) 148/78   Pulse 75   Temp 97.5  F (36.4  C) (Tympanic)   Wt 50.8 kg (112 lb)   LMP  (LMP Unknown)   SpO2 99%   Breastfeeding No   Gen: healthy,alert,no distress  Extremity: left wrist has mild swelling and tenderness but no clear deformity noted. ROM hesitant with full flexion and extension but mild pain. Able to supinate fully.  No pain in hand or snuff box.  FROM of fingers and tendon function intact. Good  strength  Forearm non tender  Right knee FROM and no swelling or laxity.   There is not compromise to the distal circulation.  Pulses are +2 and CRT is brisk  GENERAL APPEARANCE: healthy, alert and no distress  NECK: supple, non-tender to palpation, FROM   CHEST: clear to auscultation  CV: regular rate and rhythm  EXTREMITIES: peripheral pulses normal  SKIN: no suspicious lesions or rashes  Bruise left eye  NEURO: Normal strength and tone, sensory exam grossly normal, mentation intact and speech normal  Gait normal  EYES  PERRLA and EOMI. Moderate bruise lateral aspect of left eye.  No laceration   No pain with movement of eye.  No subconjunctival hemorrhage noted. Orbital rim non tender  No crepitus    Midface stable and able to open mouth fully     X-RAY was done.  No acute fracture noted of left wrist  Per my read.  Orbits intact and no fracture  Noted per my read    assessment/plan:  (W19.XXXA) Fall, initial encounter  (primary encounter diagnosis)  Comment:   Plan: fall and injury to left wrist, left orbit and right knee  No fractures noted    (S69.92XA) Wrist injury, left, initial encounter  Comment:   Plan: XR  Wrist Left G/E 3 Views, Wrist/Arm/Hand         Supplies Order for DME - ONLY FOR DME          No fracture noted  Ice and OTC med for pain  Wrist brace given for support and activity as tolerated  To Follow-up with PCP as needed if sx worsen or new sx develop    (S00.12XA) Black eye of left side, initial encounter  Comment:   Plan: XR Orbits G/E 3 Views          Left sided contusion from recent fall  No fracture noted and overall appears well with no signs of neurological impairment.  Ice affected area and normal healing discussed.  Red flag signs reviewed and to Follow-up with PCP if any change in vision, increased HA, dizziness develops  OTC med if needed for pain

## 2021-05-25 ENCOUNTER — RECORDS - HEALTHEAST (OUTPATIENT)
Dept: ADMINISTRATIVE | Facility: CLINIC | Age: 76
End: 2021-05-25

## 2021-05-26 ASSESSMENT — PATIENT HEALTH QUESTIONNAIRE - PHQ9: SUM OF ALL RESPONSES TO PHQ QUESTIONS 1-9: 15

## 2021-05-27 ENCOUNTER — RECORDS - HEALTHEAST (OUTPATIENT)
Dept: ADMINISTRATIVE | Facility: CLINIC | Age: 76
End: 2021-05-27

## 2021-05-27 ASSESSMENT — PATIENT HEALTH QUESTIONNAIRE - PHQ9: SUM OF ALL RESPONSES TO PHQ QUESTIONS 1-9: 7

## 2021-05-28 ENCOUNTER — TELEPHONE (OUTPATIENT)
Dept: INTERNAL MEDICINE | Facility: CLINIC | Age: 76
End: 2021-05-28

## 2021-05-28 ENCOUNTER — TELEPHONE (OUTPATIENT)
Dept: PEDIATRICS | Facility: CLINIC | Age: 76
End: 2021-05-28

## 2021-05-28 ENCOUNTER — NURSE TRIAGE (OUTPATIENT)
Dept: NURSING | Facility: CLINIC | Age: 76
End: 2021-05-28

## 2021-05-28 ENCOUNTER — RECORDS - HEALTHEAST (OUTPATIENT)
Dept: ADMINISTRATIVE | Facility: CLINIC | Age: 76
End: 2021-05-28

## 2021-05-28 ENCOUNTER — RECORDS - HEALTHEAST (OUTPATIENT)
Dept: SCHEDULING | Facility: CLINIC | Age: 76
End: 2021-05-28

## 2021-05-28 NOTE — TELEPHONE ENCOUNTER
Refill Approved    Rx renewed per Medication Renewal Policy. Medication was last renewed on 1/14/2019 for 90/0.  Last OV   2/14/19  Tonia Balderrama, [facility] Triage/Med Refill 4/29/2019     Requested Prescriptions   Pending Prescriptions Disp Refills     triamterene-hydrochlorothiazide (DYAZIDE) 37.5-25 mg per capsule [Pharmacy Med Name: TRIAMTERENE 37.5MG/ HCTZ 25MG CAPS] 90 capsule 0     Sig: TAKE 1 CAPSULE BY MOUTH DAILY       Diuretics/Combination Diuretics Refill Protocol  Passed - 4/26/2019  6:59 AM        Passed - Visit with PCP or prescribing provider visit in past 12 months     Last office visit with prescriber/PCP: 2/8/2018 Syed Adams MD OR same dept: 2/14/2019 David Patrick MD OR same specialty: 2/14/2019 David Patrick MD  Last physical: 6/7/2016 Last MTM visit: Visit date not found   Next visit within 3 mo: Visit date not found  Next physical within 3 mo: Visit date not found  Prescriber OR PCP: Syed Adams MD  Last diagnosis associated with med order: 1. Hypertension  - triamterene-hydrochlorothiazide (DYAZIDE) 37.5-25 mg per capsule [Pharmacy Med Name: TRIAMTERENE 37.5MG/ HCTZ 25MG CAPS]; Take 1 capsule by mouth daily.  Dispense: 90 capsule; Refill: 0    If protocol passes may refill for 12 months if within 3 months of last provider visit (or a total of 15 months).             Passed - Serum Potassium in past 12 months      Lab Results   Component Value Date    Potassium 3.5 07/23/2018             Passed - Serum Sodium in past 12 months      Lab Results   Component Value Date    Sodium 135 (L) 07/23/2018             Passed - Blood pressure on file in past 12 months     BP Readings from Last 1 Encounters:   02/14/19 124/80             Passed - Serum Creatinine in past 12 months      Creatinine   Date Value Ref Range Status   07/23/2018 1.11 (H) 0.60 - 1.10 mg/dL Final

## 2021-05-28 NOTE — TELEPHONE ENCOUNTER
Received call from pt she is wanting know her x-ray results from her UC visit 5/24/21    Relayed pt's results     Pt verbalized understanding and agrees to the plan    Thank you  Mickey Castillo RN on 5/28/2021 at 10:00 AM

## 2021-05-28 NOTE — TELEPHONE ENCOUNTER
Refill Approved    Rx renewed per Medication Renewal Policy. Medication was last renewed on 2/22/19.    Fallon Guzman, [facility] Triage/Med Refill 5/20/2019     Requested Prescriptions   Pending Prescriptions Disp Refills     citalopram (CELEXA) 40 MG tablet [Pharmacy Med Name: CITALOPRAM 40MG TABLETS] 90 tablet 0     Sig: TAKE 1 TABLET BY MOUTH EVERY DAY       [facility] Refill Protocol  Failed - 5/18/2019 11:14 AM        Failed - Age 21 and younger route to prescribing provider     Last office visit with prescriber/PCP: 2/8/2018 Syed Adams MD OR same dept: 2/14/2019 David Patrick MD OR same specialty: 2/14/2019 David Patrick MD  Last physical: 6/7/2016 Last MTM visit: Visit date not found   Next visit within 3 mo: Visit date not found  Next physical within 3 mo: Visit date not found  Prescriber OR PCP: Syed Adams MD  Last diagnosis associated with med order: 1. Depression  - citalopram (CELEXA) 40 MG tablet [Pharmacy Med Name: CITALOPRAM 40MG TABLETS]; TAKE 1 TABLET BY MOUTH EVERY DAY  Dispense: 90 tablet; Refill: 0    If protocol passes may refill for 12 months if within 3 months of last provider visit (or a total of 15 months).             Passed - PCP or prescribing provider visit in last year     Last office visit with prescriber/PCP: 2/8/2018 Syed Adams MD OR same dept: 2/14/2019 David Patrick MD OR same specialty: 2/14/2019 David Patrick MD  Last physical: 6/7/2016 Last MTM visit: Visit date not found   Next visit within 3 mo: Visit date not found  Next physical within 3 mo: Visit date not found  Prescriber OR PCP: Syed Adams MD  Last diagnosis associated with med order: 1. Depression  - citalopram (CELEXA) 40 MG tablet [Pharmacy Med Name: CITALOPRAM 40MG TABLETS]; TAKE 1 TABLET BY MOUTH EVERY DAY  Dispense: 90 tablet; Refill: 0    If protocol passes may refill for 12 months if within 3 months of last provider visit (or a total of 15 months).

## 2021-05-28 NOTE — TELEPHONE ENCOUNTER
Triage Call:    -Patient was in the [facility] in [facility] on Monday, due to a fall and hitting her face.   -Small Bump (hematoma) on the L. eyebrow that is not a new sx and was there on 5/24/21. This is not worsening, improving and getting smaller each day.   -Patient states the bump on the L. Eyebrow is improving and the bruising is also improving on her face.   -Patient wanted to know cosmetically if the bump will improve or be permanent.   -RN advised that it has only been 4 days since patient had fall. It can take up to 2 weeks for bruising to be fully improved, which per patient she is stating the bruising has improved and the small bump (hematoma) on the L. Eyebrow.  -No neurological sx currently (headache, dizziness, confusion, weakness/numbness, vision issues.)  -RN encouraged patein to ice intermittently to help with swelling of the bump on L. Eye brow.  -Per protocol recommendations are home care at this time. Home care education give and patient educated on when to call back. RN advised if bump or bruising worsening she is advised to call back right away or any new or worsening sx. Patient verbalized understanding and agrees with plan.     Leda Patten RN, BSN Nurse Triage Advisor 8:20 AM 5/28/2021       Additional Information    Minor bruise    Negative: Bruising easily is a chronic symptom (recurrent or ongoing AND present > 4 weeks)    Negative: Minor bruising at site of heparin injection  (e.g., Heparin, Lovenox, Innohep)    Negative: [1] Not caused by an injury AND [2] < 5 unexplained bruises    Negative: [1] After 10 days AND [2] bruise not fading    Negative: [1] After 3 weeks AND [2] bruise still present    Negative: Taking Coumadin (warfarin) or other strong blood thinner, or known bleeding disorder (e.g., thrombocytopenia)    Negative: [1] Not caused by an injury AND [2] 5 or more bruises now    Negative: [1] Raised bruise AND [2] size > 2 inches (5 cm) AND [3] getting bigger     Not getting bigger.  Improving    Negative: [1] SEVERE pain AND [2] not improved 2 hours after pain medicine/ice packs    Negative: Suspicious history for the injury    Negative: [1] Bruise on head/face, chest, or abdomen AND [2]  taking Coumadin (warfarin) or other strong blood thinner, or known bleeding disorder (e.g., thrombocytopenia)    Negative: Unexplained bleeding from another site (e.g., gums, nose, urine) as well    Negative: Patient sounds very sick or weak to the triager    Negative: Dizziness or lightheadedness    Negative: Bruises with fever    Negative: Tiny bruises (spots or dots) of unknown cause    Negative: Bruise(s) of forehead or head    Negative: Bruise(s) of face or jaw    Negative: Followed an injury, and triager doesn't know which injury guideline to use first    Negative: Post-operative bruising    Negative: Shock suspected (e.g., cold/pale/clammy skin, too weak to stand, low BP, rapid pulse)    Negative: Sounds like a life-threatening emergency to the triager    Protocols used: BRUISES-A-AH    COVID 19 Nurse Triage Plan/Patient Instructions    Please be aware that novel coronavirus (COVID-19) may be circulating in the community. If you develop symptoms such as fever, cough, or SOB or if you have concerns about the presence of another infection including coronavirus (COVID-19), please contact your health care provider or visit [email or web address].     Disposition/Instructions    Home care recommended. Follow home care protocol based instructions.    Thank you for taking steps to prevent the spread of this virus.  o Limit your contact with others.  o Wear a simple mask to cover your cough.  o Wash your hands well and often.    Resources    [facility]: About COVID-19: [email or web address]    CDC: What to Do If You're Sick: www.cdc.gov/coronavirus/2019-ncov/about/steps-when-sick.html    CDC: Ending Home Isolation: www.cdc.gov/coronavirus/2019-ncov/hcp/disposition-in-home-patients.html      CDC: Caring for Someone: www.cdc.gov/coronavirus/2019-ncov/if-you-are-sick/care-for-someone.html     [facility]: Interim Guidance for Hospital Discharge to Home: www.health.[facility].[place]/diseases/coronavirus/hcp/hospdischarge.pdf    [facility] clinical trials (COVID-19 research studies): clinicalaffairs.[facility]-clinical-trials     Below are the COVID-19 hotlines at the [place] Department of Health ([facility]). Interpreters are available.   o For health questions: Call 124-020-1757 or 1-930.143.4370 (7 a.m. to 7 p.m.)  o For questions about schools and childcare: Call 662-324-8393 or 1-674.711.7322 (7 a.m. to 7 p.m.)

## 2021-05-31 VITALS — HEIGHT: 57 IN | WEIGHT: 130 LBS | BODY MASS INDEX: 28.05 KG/M2

## 2021-05-31 VITALS — WEIGHT: 114 LBS | HEIGHT: 57 IN | BODY MASS INDEX: 24.59 KG/M2

## 2021-05-31 VITALS — HEIGHT: 57 IN | WEIGHT: 136 LBS | BODY MASS INDEX: 29.34 KG/M2

## 2021-05-31 VITALS — WEIGHT: 135 LBS | BODY MASS INDEX: 29.21 KG/M2

## 2021-05-31 NOTE — TELEPHONE ENCOUNTER
Controlled Substance Refill Request  Medication:   Requested Prescriptions     Pending Prescriptions Disp Refills     clonazePAM (KLONOPIN) 0.5 MG tablet [Pharmacy Med Name: CLONAZEPAM 0.5MG TABLETS] 50 tablet 0     Sig: TAKE 1 TABLET(0.5 MG) BY MOUTH TWICE DAILY AS NEEDED FOR ANXIETY     Date Last Fill: 6/17/19  Pharmacy: [id number]   Submit electronically to pharmacy  Controlled Substance Agreement on File:   Encounter-Level CSA Scan Date:    There are no encounter-level csa scan date.       Last office visit: Last office visit pertaining to requested medication was 2/14/19.

## 2021-06-02 VITALS — BODY MASS INDEX: 27.21 KG/M2 | WEIGHT: 135 LBS | HEIGHT: 59 IN

## 2021-06-02 NOTE — TELEPHONE ENCOUNTER
Controlled Substance Refill Request  Medication:   Requested Prescriptions     Pending Prescriptions Disp Refills     clonazePAM (KLONOPIN) 0.5 MG tablet [Pharmacy Med Name: CLONAZEPAM 0.5MG TABLETS] 50 tablet 0     Sig: TAKE 1 TABLET(0.5 MG) BY MOUTH TWICE DAILY AS NEEDED FOR ANXIETY     Date Last Fill: 9.19.19  Pharmacy: [facility]   Submit electronically to pharmacy  Controlled Substance Agreement on File:   Encounter-Level CSA Scan Date:    There are no encounter-level csa scan date.       Last office visit: Last office visit pertaining to requested medication was 2.14.19.

## 2021-06-02 NOTE — TELEPHONE ENCOUNTER
Who is calling:  Patient   Reason for Call:  Patient stated I would like Gina to return my call to talk about Syed Adams MD long term.  Date of last appointment with primary care: 2/14/19  Okay to leave a detailed message: Yes

## 2021-06-02 NOTE — TELEPHONE ENCOUNTER
Called patient- she has an upcoming appt with Dr.. Cunningham- though she is unable to find info on her on the internet. Advised her to go to the [email or web address] site to look vs googling her.

## 2021-06-02 NOTE — PROGRESS NOTES
Assessment and Plan:     1. Wellness examination  Her exam is satisfactory today.  We did review the documentation for her wellness visit.  She has not had any recent falls.  She is concerned about her memory and is interested in being evaluated in the memory clinic.  I am going to set that up for her.  She does not have an advanced care directive and we discussed the benefit of this.  We also discussed resuscitation status and she would like to be full code.  She recently had a Cologuard done, mammogram, bone density, lipids and other screening tests.  She is up-to-date.  She is up-to-date on vaccinations.    2. Recurrent major depressive disorder, remission status unspecified (H)  She was recently started on medication and has follow-up with the nurse care coordinator at [facility].    3. Osteoporosis of vertebra  She is going to be seeing a bone specialist in January.  I will plan to see her after that visit so we can reassess her chronic medical problems.  She has been on Fosamax for about 4 years.    4. Memory loss  - Ambulatory referral to [facility]rology [facility]    5. Hypertension  This is well controlled.  - triamterene-hydrochlorothiazide (DYAZIDE) 37.5-25 mg per capsule; Take 1 capsule by mouth daily.  Dispense: 90 capsule; Refill: 3  - losartan (COZAAR) 25 MG tablet; Take 1 tablet (25 mg total) by mouth daily.  Dispense: 90 tablet; Refill: 3    The patient's current medical problems were reviewed.    I have had an Advance Directives discussion with the patient.  The following health maintenance schedule was reviewed with the patient and provided in printed form in the after visit summary:   Health Maintenance   Topic Date Due     DEPRESSION FOLLOW UP  1945     HEPATITIS C SCREENING  1945     ZOSTER VACCINES (1 of 2) 01/09/1996     MEDICARE ANNUAL WELLNESS VISIT  01/09/2011     DXA SCAN  01/09/2011     COLONOSCOPY  10/20/2015     FALL RISK ASSESSMENT  10/25/2020     ADVANCE CARE PLANNING   2021     MAMMOGRAM  10/14/2021     TD 18+ HE  2022     PNEUMOCOCCAL IMMUNIZATION 65+ LOW/MEDIUM RISK  Completed     INFLUENZA VACCINE RULE BASED  Completed        Subjective:   Chief Complaint: Mariya Sorensen is an 74 y.o. female here for an Annual Wellness visit.   HPI: She comes in to [facility] care and for an annual wellness visit.  She recently had screening done after a visit at the [facility] in [place].  She has had a mammogram, Cologuard, hepatitis C screening, bone density, lipids, and is up-to-date on vaccinations.  We did review the paperwork for her wellness visit and she has not had recent problems with falling.  She is concerned about her memory.  She is interested in seeing someone to discuss memory concerns.  She has been working on her depression and is on a new dose of medication.  She has follow-up with this.  She also was recently told that her bone density worsened even on Fosamax and has an appointment scheduled with an osteoporosis specialist.  She has no other acute concerns today.  She does not have an advanced directive but we did discuss this in detail.    Review of Systems:   Please see above.  The rest of the review of systems are negative for all systems.    Patient Care Team:  Syed Adams MD as PCP - General  David Patrick MD as Assigned PCP     Patient Active Problem List   Diagnosis     Migraine Headache     Renal Insufficiency     Hypertension     Depression     Preop examination     Past Medical History:   Diagnosis Date     Hypertension       Past Surgical History:   Procedure Laterality Date     FOOT SURGERY       SD  DELIVERY ONLY      Description:  Section;  Recorded: 2010;  Comments: x2     SD CORRJ HALLUX VALGUS W/SESMDC W/2 OSTEOT      Description: Hallux Valgus (Bunion) Correction;  Recorded: 2010;     SD REMOVAL OF TONSILS,<11 Y/O      Description: Tonsillectomy;  Recorded: 2010;     SHOULDER SURGERY         No family history on file.   Social History     Socioeconomic History     Marital status:      Spouse name: Not on file     Number of children: 2     Years of education: Not on file     Highest education level: Not on file   Occupational History     Occupation: works in office   Social Needs     Financial resource strain: Not on file     Food insecurity:     Worry: Not on file     Inability: Not on file     Transportation needs:     Medical: Not on file     Non-medical: Not on file   Tobacco Use     Smoking status: Former Smoker     Packs/day: 0.00     Smokeless tobacco: Never Used   Substance and Sexual Activity     Alcohol use: Not Currently     Drug use: Never     Sexual activity: Yes     Partners: Male   Lifestyle     Physical activity:     Days per week: Not on file     Minutes per session: Not on file     Stress: Not on file   Relationships     Social connections:     Talks on phone: Not on file     Gets together: Not on file     Attends L[employer]-day service: Not on file     Active member of club or organization: Not on file     Attends meetings of clubs or organizations: Not on file     Relationship status: Not on file     Intimate partner violence:     Fear of current or ex partner: Not on file     Emotionally abused: Not on file     Physically abused: Not on file     Forced sexual activity: Not on file   Other Topics Concern     Not on file   Social History Narrative     Not on file      Current Outpatient Medications   Medication Sig Dispense Refill     alendronate (FOSAMAX) 70 MG tablet TAKE 1 TABLET BY MOUTH WEEKLY. TAKE 30 MINUTES BEFORE FIRST FOOD WITH 8 OUNCES OF WATER; AVOID LYING DOWN FOR 30 MINUTES. 12 tablet 0     aspirin 81 mg chewable tablet Chew 81 mg daily.       buPROPion (WELLBUTRIN SR) 150 MG 12 hr tablet TAKE 3 TABLETS BY MOUTH EVERY  tablet 0     CALCIUM-VITAMIN D3 ORAL Take 1 tablet by mouth daily.       cholecalciferol, vitamin D3, 1,000 unit tablet Take 1,000 Units by  "mouth daily.       clonazePAM (KLONOPIN) 0.5 MG tablet TAKE 1 TABLET(0.5 MG) BY MOUTH TWICE DAILY AS NEEDED FOR ANXIETY 50 tablet 0     FLUoxetine (PROZAC) 20 MG capsule   3     losartan (COZAAR) 25 MG tablet Take 1 tablet (25 mg total) by mouth daily. 90 tablet 3     triamterene-hydrochlorothiazide (DYAZIDE) 37.5-25 mg per capsule Take 1 capsule by mouth daily. 90 capsule 3     No current facility-administered medications for this visit.       Objective:   Vital Signs:   Visit Vitals  /64 (Patient Site: Right Arm, Patient Position: Sitting)   Pulse 64   Ht 4' 11\" (1.499 m)   Wt 135 lb (61.2 kg)   SpO2 98%   BMI 27.27 kg/m         VisionScreening:  No exam data present     PHYSICAL EXAM  General: This is an alert female, sitting comfortably, no apparent distress.    Assessment Results 10/25/2019   Activities of Daily Living No help needed   Instrumental Activities of Daily Living No help needed   Mini Cog Total Score 4   Some recent data might be hidden     A Mini-Cog score of 0-2 suggests the possibility of dementia, score of 3-5 suggests no dementia    Identified Health Risks:     She is at risk for lack of exercise and has been provided with information to increase physical activity for the benefit of her well-being.  The patient was counseled and encouraged to consider modifying their diet and eating habits. She was provided with information on recommended healthy diet options.  The patient was provided with written information regarding signs of hearing loss.  The patient s PHQ-9 score is consistent with moderate depression.  She was provided with information regarding depression and was advised to schedule a follow up appointment in 3 months weeks to further address this issue.  Information regarding advance directives (living lim), including where she can download the appropriate form, was provided to the patient via the AVS.       "

## 2021-06-03 VITALS
OXYGEN SATURATION: 98 % | SYSTOLIC BLOOD PRESSURE: 118 MMHG | HEART RATE: 64 BPM | HEIGHT: 59 IN | WEIGHT: 135 LBS | BODY MASS INDEX: 27.21 KG/M2 | DIASTOLIC BLOOD PRESSURE: 64 MMHG

## 2021-06-03 NOTE — TELEPHONE ENCOUNTER
Controlled Substance Refill Request  Medication:   Requested Prescriptions     Pending Prescriptions Disp Refills     clonazePAM (KLONOPIN) 0.5 MG tablet [Pharmacy Med Name: CLONAZEPAM 0.5MG TABLETS] 50 tablet 0     Sig: TAKE 1 TABLET(0.5 MG) BY MOUTH TWICE DAILY AS NEEDED FOR ANXIETY     Date Last Fill:  10/28/2019  Pharmacy: [id number]    Submit electronically to pharmacy  Controlled Substance Agreement on File:   Encounter-Level CSA Scan Date:    There are no encounter-level csa scan date.       Last office visit: 10/25/2019. Last office visit pertaining to requested medication was ? Chart reviewed to 5/24/2018 and it was not found.

## 2021-06-03 NOTE — TELEPHONE ENCOUNTER
Refill Approved    Rx renewed per Medication Renewal Policy. Medication was last renewed on 8/17/19.    Fallon Guzman, [facility] Connection Triage/Med Refill 11/14/2019     Requested Prescriptions   Pending Prescriptions Disp Refills     buPROPion (WELLBUTRIN SR) 150 MG 12 hr tablet [Pharmacy Med Name: BUPROPION SR 150MG TABLETS (12 H)] 270 tablet 0     Sig: TAKE 3 TABLETS BY MOUTH EVERY DAY       Tricyclics/Misc Antidepressant/Antianxiety Meds Refill Protocol Passed - 11/14/2019  5:14 AM        Passed - PCP or prescribing provider visit in last year     Last office visit with prescriber/PCP: 2/8/2018 Syed Adams MD OR same dept: 2/14/2019 David Patrick MD OR same specialty: 2/14/2019 David Patrick MD  Last physical: 6/7/2016 Last MTM visit: Visit date not found   Next visit within 3 mo: Visit date not found  Next physical within 3 mo: Visit date not found  Prescriber OR PCP: Syed Adams MD  Last diagnosis associated with med order: 1. Depressed  - buPROPion (WELLBUTRIN SR) 150 MG 12 hr tablet [Pharmacy Med Name: BUPROPION SR 150MG TABLETS (12 H)]; TAKE 3 TABLETS BY MOUTH EVERY DAY  Dispense: 270 tablet; Refill: 0    If protocol passes may refill for 12 months if within 3 months of last provider visit (or a total of 15 months).

## 2021-06-04 VITALS
DIASTOLIC BLOOD PRESSURE: 64 MMHG | HEIGHT: 57 IN | WEIGHT: 112 LBS | SYSTOLIC BLOOD PRESSURE: 110 MMHG | BODY MASS INDEX: 24.16 KG/M2 | HEART RATE: 78 BPM | OXYGEN SATURATION: 99 %

## 2021-06-05 VITALS
WEIGHT: 113 LBS | OXYGEN SATURATION: 98 % | HEART RATE: 80 BPM | SYSTOLIC BLOOD PRESSURE: 110 MMHG | DIASTOLIC BLOOD PRESSURE: 72 MMHG | TEMPERATURE: 97.9 F | BODY MASS INDEX: 24.38 KG/M2 | HEIGHT: 57 IN

## 2021-06-05 NOTE — TELEPHONE ENCOUNTER
Controlled Substance Refill Request  Medication Name:   Requested Prescriptions     Pending Prescriptions Disp Refills     clonazePAM (KLONOPIN) 0.5 MG tablet [Pharmacy Med Name: CLONAZEPAM 0.5MG TABLETS] 50 tablet 0     Sig: TAKE 1 TABLET(0.5 MG) BY MOUTH TWICE DAILY AS NEEDED FOR ANXIETY     Date Last Fill: 11/18/19  Requested [facility]acy: [facility]  Submit electronically to pharmacy  Controlled Substance Agreement on file:   Encounter-Level CSA Scan Date:    There are no encounter-level csa scan date.        Last office visit:  10/25/19

## 2021-06-05 NOTE — TELEPHONE ENCOUNTER
Prescription Monitoring Program activity reviewed with no discrepancies noted.      Last fill per : 11/19/2019  Quantity/days supply: 50    Controlled Substance Agreement on file: No  Date:     Last office visit with provider:  10/25/2019    Please advise.

## 2021-06-06 NOTE — TELEPHONE ENCOUNTER
Controlled Substance Refill Request  Medication Name:   Requested Prescriptions     Pending Prescriptions Disp Refills     clonazePAM (KLONOPIN) 0.5 MG tablet 30 tablet 0     Sig: Take 1 tablet (0.5 mg total) by mouth at bedtime as needed for anxiety. Limit use to just 3-4 times a week and not every day.     Date Last Fill: 1.20.2020  Is patient out of medication?: N/A - electronic request  Patient notified refills processed within 3 business days: N/A - electronic request  Requested Pharmacy: [facility]  Submit electronically to pharmacy  Controlled Substance Agreement on file:   Encounter-Level CSA Scan Date:    There are no encounter-level csa scan date.        Last office visit:  2.14.2019

## 2021-06-06 NOTE — TELEPHONE ENCOUNTER
Prescription Monitoring Program activity reviewed with no discrepancies noted.      Last fill per : 1/20/2020  Quantity/days supply: 30    Controlled Substance Agreement on file: No  Date:    Last office visi with provider: Physical 10/25/2019    Please advise.

## 2021-06-07 NOTE — TELEPHONE ENCOUNTER
Prescription Monitoring Program activity reviewed with no discrepancies noted.      Last fill per : 03/10/2020  Quantity/days supply: 30    Controlled Substance Agreement on file: No  Date:     Last office visit with provider:  10/25/2019    Please advise.

## 2021-06-07 NOTE — TELEPHONE ENCOUNTER
Pt is calling in about having balance issues. Pt denies any dizziness, but is unable to keep her balance. Pt also reports a couple days ago she had trouble controlling her left foot. Pt admits to falling and hitting her head last Friday. Pt had a large lump above her left eye, and a black eye, and scraped the right side of her forehead. Pt seems a little confused. Pt reports last Friday she called a triage nurse, and refused to go in after it happened, because she was nauseated and had diarrhea. Pt thinks she may have passed out on the toilet when she hit her head.  Pt reports she is unable to keep her balance when walking. Pt reports  is home with her. Pt denies any headache, fever, shortness of breath, cough, runny nose, or sore throat. Pt denies any vomiting or diarrhea, or any weakness, numbness, or tingling of her face, arm, or leg on one side of the body.     Per protocol pt needs to go to the ER now, pt agrees with plan and will have her  drive her. Advised pt to call back if symptoms worsen.     Bj Ramos RN Care Connection Triage/Medication Refill    Reason for Disposition    Can't walk or can barely walk    Protocols used: NEUROLOGIC DEFICIT-A-[place]

## 2021-06-08 ENCOUNTER — RECORDS - HEALTHEAST (OUTPATIENT)
Dept: FAMILY MEDICINE | Facility: CLINIC | Age: 76
End: 2021-06-08

## 2021-06-08 DIAGNOSIS — M25.561 PAIN IN RIGHT KNEE: ICD-10-CM

## 2021-06-08 NOTE — TELEPHONE ENCOUNTER
Controlled Substance Refill Request  Medication Name:   Requested Prescriptions     Pending Prescriptions Disp Refills     clonazePAM (KLONOPIN) 0.5 MG tablet [Pharmacy Med Name: CLONAZEPAM 0.5MG TABLETS] 30 tablet 0     Sig: TAKE 1 TABLET BY MOUTH EVERY NIGHT AT BEDTIME AS NEEDED FOR ANXIETY(UP TO 3-4 TIMES PER WEEK)     Date Last Fill: 4/22/20  Requested [facility]acy: [facility]  Submit electronically to pharmacy  Controlled Substance Agreement on file:   Encounter-Level CSA Scan Date:    There are no encounter-level csa scan date.        Last office visit:  10/25/19      
How Severe Is Your Skin Lesion?: mild
Prescription Monitoring Program activity reviewed with no discrepancies noted.      Last fill per : 4/22/2020  Quantity/days supply: 30    Controlled Substance Agreement on file: No  Date:     Last office visit with provider:  Physical 10/25/2019    Please advise.  
Have Your Skin Lesions Been Treated?: not been treated
Is This A New Presentation, Or A Follow-Up?: Skin Lesions

## 2021-06-10 NOTE — TELEPHONE ENCOUNTER
Prescription Monitoring Program activity reviewed with no discrepancies noted.      Last fill per : 06/01/1920  Quantity/days supply: 30    Controlled Substance Agreement on file: No  Date:     Last office visit with provider:  10/25/2019    Please advise.

## 2021-06-10 NOTE — TELEPHONE ENCOUNTER
Controlled Substance Refill Request  Medication Name:   Requested Prescriptions     Pending Prescriptions Disp Refills     clonazePAM (KLONOPIN) 0.5 MG tablet [Pharmacy Med Name: CLONAZEPAM 0.5MG TABLETS] 30 tablet 0     Sig: TAKE 1 TABLET BY MOUTH UP TO 3 TO 4 TIMES EVERY WEEK EVERY NIGHT AT BEDTIME AS NEEDED FOR ANXIETY     Date Last Fill: 5/29/20  Requested Pharmacy: [facility]  Submit electronically to pharmacy  Controlled Substance Agreement on file:   Encounter-Level CSA Scan Date:    There are no encounter-level csa scan date.        Last office visit:  10/25/19  Cristina Mcmullen RN, MA  [facility]    Triage Nurse Advisor

## 2021-06-11 NOTE — TELEPHONE ENCOUNTER
Prescription Monitoring Program activity reviewed with no discrepancies noted.      Last fill per : 08/03/1920  Quantity/days supply: 30    Controlled Substance Agreement on file: No  Date:     Last office visit with provider:  10/25/2019    Please advise.

## 2021-06-11 NOTE — TELEPHONE ENCOUNTER
Controlled Substance Refill Request  Medication Name:   Requested Prescriptions     Pending Prescriptions Disp Refills     clonazePAM (KLONOPIN) 0.5 MG tablet [Pharmacy Med Name: CLONAZEPAM 0.5MG TABLETS] 30 tablet 0     Sig: TAKE 1 TABLET BY MOUTH UP TO 3 TO 4 TIMES EVERY WEEK EVERY NIGHT AT BEDTIME AS NEEDED FOR ANXIETY     Date Last Fill: 8/3/2020  Requested Pharmacy: [facility] #[id number], [address]  Submit electronically to pharmacy  Controlled Substance Agreement on file:   Encounter-Level CSA Scan Date:    There are no encounter-level csa scan date.        Last office visit:  10/25/2019 with PCP Dr KEERTHI Cunningham

## 2021-06-12 NOTE — TELEPHONE ENCOUNTER
Prescription Monitoring Program activity reviewed with no discrepancies noted.      Last fill per : 09/16/2020  Quantity/days supply: 30    Controlled Substance Agreement on file: No  Date:     Last office visit with provider: Physical 10/25/2019  Visit date not found    Please advise.

## 2021-06-12 NOTE — TELEPHONE ENCOUNTER
"RN Triage:    Calling with 2 problems:    1.  Balance has been off x about 2 years.  \"I have had many falls\".  Daughters are wanting her to be evaluated for this.    2.  Woke up with neck pain and bilateral arm pain 4 days ago.  Pain has been present since then.  No precipitating event.  Tylenol only mildly helpful.  Has full ROM but is painful.  No fever.    Office visit scheduled for tomorrow.    Carolyn Dudley RN  [facility] Nurse Advisor          Reason for Disposition    MODERATE neck pain (e.g., interferes with normal activities like work or school)    Additional Information    Negative: Shock suspected (e.g., cold/pale/clammy skin, too weak to stand, low BP, rapid pulse)    Negative: Similar pain previously and it was from 'heart attack'    Negative: Similar pain previously from 'angina' and not relieved by nitroglycerin    Negative: Difficult to awaken or acting confused (e.g., disoriented, slurred speech)    Negative: Sounds like a life-threatening emergency to the triager    Negative: Followed an injury to neck (e.g., MVA, sports, impact or collision)    Negative: Chest pain    Negative: Lymph node in the neck is swollen or painful to the touch    Negative: Sore throat is the main symptom    Negative: Difficulty breathing or unusual sweating (e.g., sweating without exertion)    Negative: Chest pain lasting longer than 5 minutes    Negative: Stiff neck (can't touch chin to chest) and has headache    Negative: Stiff neck (can't touch chin to chest) and fever    Negative: Weakness of an arm or hand    Negative: Problems with bowel or bladder control    Negative: Patient sounds very sick or weak to the triager    Negative: SEVERE pain (e.g., excruciating, unable to do any normal activities)    Negative: Head is twisting to one side (or ask 'is it turning against your will?')    Negative: Fever > 103 F (39.4 C)    Negative: Fever > 100.0 F (37.8 C) and IVDA (intravenous drug abuse)    Negative: " Fever > 100.0 F (37.8 C) and has diabetes mellitus or a weak immune system (e.g., HIV positive, cancer chemotherapy, organ transplant, splenectomy, chronic steroids)    Negative: Numbness in an arm or hand (i.e., loss of sensation)    Negative: Painful rash with multiple small blisters grouped together (i.e., dermatomal distribution or 'band' or 'stripe')    Negative: High-risk adult (e.g., history of cancer, HIV, or IV drug abuse)    Negative: Patient wants to be seen    Negative: Tenderness in front of neck over windpipe    Protocols used: NECK PAIN OR [id number]

## 2021-06-12 NOTE — PROGRESS NOTES
Office Visit   Mariya Sorensen   75 y.o. female    Date of Visit: 10/16/2020    Chief Complaint   Patient presents with     Balance issues     No dizziness     Pain     Pain right arm and shoulder for 3 months, neck pain for 1 month        Assessment and Plan   1. Dizziness  This is of unclear etiology.  I am going to recheck her labs today.  I am going to refer her to the balance and dizziness clinic for further evaluation.  We did discuss that it is possibly related to some of her medications.  I am going to cut back on her blood pressure medicine as her blood pressure is a little bit on the low side.  We will have her stop the hydrochlorothiazide.  She is going to monitor her blood pressure over the next month and let me know if it starts to get too high or if she starts to have fluid retention.  We will be seeing her back in about 6 weeks for a physical exam and will reassess at that time.  She is in agreement with this plan.  - HM2(CBC w/o Differential)  - Thyroid Cascade  - Vitamin B12  - Ambulatory referral to Neurology    2. Hypertension  As above we will be monitoring her blood pressure on just the losartan and not the hydrochlorothiazide.  - Thyroid Rugby  - Comprehensive Metabolic Panel  - Vitamin B12  - Lipid Cascade    3. Renal Insufficiency  - Comprehensive Metabolic Panel  - Lipid Cascade    4. Chronic right shoulder pain  She is having significant right shoulder pain with decreased range of motion.  I am going to refer her to Ortho to evaluate this further.  - Ambulatory referral to Orthopedics    5. Neck pain  - Ambulatory referral to Orthopedics    6. Anxiety  - clonazePAM (KLONOPIN) 0.5 MG tablet; Take 1 tablet (0.5 mg total) by mouth at bedtime as needed for anxiety.  Dispense: 30 tablet; Refill: 0         Return in about 6 weeks (around 11/27/2020) for Routine preventive.     History of Present Illness   This 75 y.o. old female comes in with a couple of concerns.  I last saw her over a  year ago.  She has chronic symptoms of feeling off balance.  States it is been going on for couple of years.  She feels like it needs to be reevaluated.  I did review outside records and she was evaluated at [facility] little bit over a year ago and had negative blood work.  She feels it has not gotten worse but it also has not gotten any better.  She just feels off balance when she is walking and standing.  She does not feel that she is dizzy or that things are spinning.  She does not feel lightheaded.  Her blood pressure is on the low side today and she states she has not really been checking it at home.  She has not had any chest pain or palpitations.  She also notes over the last week she has had significant right shoulder pain and neck pain.  She did not have any injury.  She has had rotator cuff surgery in the past and has decreased range of motion of that shoulder.    Review of Systems: As above, systems otherwise reviewed and negative.     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       buPROPion (WELLBUTRIN SR) 150 MG 12 hr tablet TAKE 3 TABLETS BY MOUTH EVERY DAY (Patient taking differently: 150 mg. Takes 2 tablets daily) 270 tablet 0     CALCIUM-VITAMIN D3 ORAL Take 1 tablet by mouth daily.       clonazePAM (KLONOPIN) 0.5 MG tablet Take 1 tablet (0.5 mg total) by mouth at bedtime as needed for anxiety. 30 tablet 0     cyanocobalamin 1000 MCG tablet Take 1,000 mcg by mouth daily.       FLUoxetine (PROZAC) 20 MG capsule   3     losartan (COZAAR) 25 MG tablet Take 1 tablet (25 mg total) by mouth daily. 90 tablet  "3     No current facility-administered medications for this visit.      Allergies   Allergen Reactions     Penicillins Rash          Physical Exam     /64 (Patient Site: Left Arm, Patient Position: Sitting)   Pulse 78   Ht 4' 9\" (1.448 m)   Wt 112 lb (50.8 kg)   SpO2 99%   BMI 24.24 kg/m      This is an alert female, sitting comfortably, no apparent distress.     Additional Information   Social History     Tobacco Use     Smoking status: Former Smoker     Packs/day: 0.00     Smokeless tobacco: Never Used   Substance Use Topics     Alcohol use: Not Currently     Drug use: Never            Annie Cunningham MD    "

## 2021-06-12 NOTE — TELEPHONE ENCOUNTER
Controlled Substance Refill Request  Medication Name:   Requested Prescriptions     Pending Prescriptions Disp Refills     clonazePAM (KLONOPIN) 0.5 MG tablet [Pharmacy Med Name: CLONAZEPAM 0.5MG TABLETS] 30 tablet 0     Sig: TAKE 1 TABLET BY MOUTH UP TO 3 TO 4 TIMES EVERY WEEK EVERY NIGHT AT BEDTIME AS NEEDED FOR ANXIETY     Date Last Fill: 9/14/20  [facility]  Submit electronically to pharmacy  Controlled Substance Agreement on file:   Encounter-Level CSA Scan Date:    There are no encounter-level csa scan date.        Last office visit:  10/25/19

## 2021-06-12 NOTE — TELEPHONE ENCOUNTER
triamterene-hydrochlorothiazide (DYAZIDE) 37.5-25 mg per capsule [[id number]]    Electronically signed by: Annie Cunningham MD on 10/25/19 1550 Status: Discontinued   Ordering user: Annie Cunningahm MD 10/25/19 1550 Authorized by: Annie Cunningham MD   Frequency: DAILY 10/25/19 - 10/16/20  Discontinued by: Annie Cunningham MD 10/16/20 0951

## 2021-06-13 ENCOUNTER — HEALTH MAINTENANCE LETTER (OUTPATIENT)
Age: 76
End: 2021-06-13

## 2021-06-13 NOTE — PROGRESS NOTES
Chief Complaint   Patient presents with     Fall     today fell down the last step. right knee        History of Present Illness: Nursing notes reviewed.   Patient reports right knee pain that started earlier today.  Patient noted that she was going down her basement and she missed her last step and fell landed on her right knee.  She helped herself up.  She immediately felt pain.  Pain is moderate in severity.  Pain is worse with any movement.  She denies any numbness or tingling or any neurological symptoms.  No loss of consciousness or head injury with the fall  Review of systems: See history of present illness, otherwise negative.     Current Outpatient Prescriptions   Medication Sig Dispense Refill     buPROPion (WELLBUTRIN SR) 150 MG 12 hr tablet TAKE 3 TABLETS BY MOUTH EVERY DAY 90 tablet 0     citalopram (CELEXA) 20 MG tablet TAKE 1 TABLET BY MOUTH DAILY 100 tablet 0     triamterene-hydrochlorothiazide (DYAZIDE) 37.5-25 mg per capsule TAKE 1 CAPSULE BY MOUTH DAILY 100 capsule 0     alendronate (FOSAMAX) 70 MG tablet   3     buPROPion (WELLBUTRIN SR) 150 MG 12 hr tablet TAKE 3 TABLETS BY MOUTH EVERY  tablet 0     citalopram (CELEXA) 20 MG tablet TAKE 1 TABLET BY MOUTH DAILY 100 tablet 0     clonazePAM (KLONOPIN) 0.5 MG tablet TAKE 1 TABLET BY MOUTH EVERY DAY AS NEEDED 50 tablet 0     No current facility-administered medications for this visit.        No past medical history on file.   Past Surgical History:   Procedure Laterality Date     [place]  DELIVERY [place]      Description:  Section;  Recorded: 2010;  Comments: x2     [facility] CORRJ HALLUX VALGUS W/SESMDC W/2 OSTEOT      Description: Hallux Valgus (Bunion) Correction;  Recorded: 2010;     [facility] REMOVAL OF TONSILS,<11 Y/O      Description: Tonsillectomy;  Recorded: 2010;      Social History     Social History     Marital status:      Spouse name: N/A     Number of children: N/A     Years of education: N/A     Social  History Main Topics     Smoking status: Former Smoker     Smokeless tobacco: None     Alcohol use None     Drug use: None     Sexual activity: Not Asked     Other Topics Concern     None     Social History Narrative       History   Smoking Status     Former Smoker   Smokeless Tobacco     Not on file      Exam:   Blood pressure 126/78, pulse 88, temperature 98.2  F (36.8  C), temperature source Oral, resp. rate 14, weight 135 lb (61.2 kg), SpO2 100 %, not currently breastfeeding.    EXAM:   General: Well-groomed well-developed adult female in no distress.  Lungs she has a normal respiratory respiratory effort breath sounds are clear.  Heart she has a good S1 and S2 no obvious murmur noted peripheral pulses are palpable.  Musculoskeletal she has tenderness around her right kneecap.  Good range of motion although there is some discomfort with knee flexion.  And internal rotation.  Skin on inspection no obvious rashes noted she is warm to touch skin turgor appear normal    No results found for this or any previous visit (from the past 24 hour(s)).   Assessment/Plan   1. Acute pain of right knee  XR Knee Bilateral Plus [place] [facility]    XR Knee Bilateral Plus [place] [facility]   Reveals no acute fracture.  May use Tylenol alternating with ibuprofen for any discomfort.  Return to clinic or go to the nearest emergency room or hospital not feeling well.    There are no Patient Instructions on file for this visit.   Wil Barry MD

## 2021-06-13 NOTE — PROGRESS NOTES
Assessment and Plan:     1. Wellness examination  Her examination is normal today.  We will set up a mammogram.  She is up-to-date on colon cancer screening.  Recommend that she get a shingles vaccine.  We reviewed the documentation for her wellness visit.  She does have problems with anxiety and depression and will continue her medications.  She does not have an advanced directive and we did discuss the importance of getting 1.  Her's memory screening was satisfactory however she does feel that memory is a problem for her.  She also notes that she has ongoing dizziness and unsteadiness.  It is improved since we decreased her blood pressure medicine at her last visit but it still there.  Sometimes it is worse than others.  She has no other acute concerns today.  She does need refills on medications.  She is otherwise up-to-date on age-appropriate health maintenance.    2. Recurrent major depressive disorder, remission status unspecified (H)  I do have to wonder if her bupropion could be causing some of her dizziness.  She is taking 12-hour dosing and taking 300 mg in the morning.  It is possible that that could be causing some of her dizziness.  I am going to change her to a 24-hour tablet and have her does take 150 mg daily.  We discussed that if her symptoms worsen or do not start to improve then she should come back into follow-up.  She is in agreement with this plan.  - buPROPion (WELLBUTRIN XL) 150 MG 24 hr tablet; Take 1 tablet (150 mg total) by mouth every morning.  Dispense: 90 tablet; Refill: 3  - FLUoxetine (PROZAC) 20 MG capsule; Take 2 capsules (40 mg total) by mouth daily.  Dispense: 180 capsule; Refill: 3    3. Memory loss  Although her screening is normal she does have some trouble with memory.  I think it would be beneficial to have her evaluated in neurology.  - Ambulatory referral to Neurology    4. Dizziness  This has improved with decreasing her blood pressure medicine but continues.  It is of  unclear etiology.  It might help with the change in her bupropion.  Her blood pressures have been running mostly in the 130 range at home so she will continue to monitor that.  We will also have her see neurology.  - Ambulatory referral to Neurology    5. Anxiety  - clonazePAM (KLONOPIN) 0.5 MG tablet; Take 1 tablet (0.5 mg total) by mouth at bedtime as needed for anxiety.  Dispense: 30 tablet; Refill: 0    6. Visit for screening mammogram  - Mammo Screening Bilateral; Future      The patient's current medical problems were reviewed.    I have had an Advance Directives discussion with the patient.  The following health maintenance schedule was reviewed with the patient and provided in printed form in the after visit summary:   Health Maintenance   Topic Date Due     DEPRESSION ACTION PLAN  1945     ZOSTER VACCINES (2 of 3) 12/03/2012     MAMMOGRAM  10/14/2021     MEDICARE ANNUAL WELLNESS VISIT  12/01/2021     FALL RISK ASSESSMENT  12/01/2021     TD 18+ HE  01/25/2022     COLORECTAL CANCER SCREENING  09/30/2022     ADVANCE CARE PLANNING  10/25/2024     LIPID  10/16/2025     HEPATITIS C SCREENING  Completed     Pneumococcal Vaccine: 65+ Years  Completed     INFLUENZA VACCINE RULE BASED  Completed     Pneumococcal Vaccine: Pediatrics (0 to 5 Years) and At-Risk Patients (6 to 64 Years)  Aged Out     HEPATITIS B VACCINES  Aged Out        Subjective:   Chief Complaint: Mariya Sorensen is an 75 y.o. female here for an Annual Wellness visit.   HPI: She comes in for an annual exam.  We reviewed documentation for her wellness visit.  She has a history of depression which is ongoing.  She has some dizziness but has not had any falls.  She does not have an advanced directive we discussed the importance of getting 1.  Her memory screening is normal however she feels that memory is a problem for her.  These are her biggest concerns are her memory as well as ongoing dizziness and unsteadiness.  She has had normal lab work.   She does not have any chest pain or palpitations or other cardiac symptoms.  She has no other acute concerns today.    Review of Systems:  Please see above.  The rest of the review of systems are negative for all systems.    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   Social History     Socioeconomic History     Marital status:      Spouse name: Not on file     Number of children: 2     Years of education: Not on file     Highest education level: Not on file   Occupational History     Occupation: works in office   Social Needs     Financial resource strain: Not on file     Food insecurity     Worry: Not on file     Inability: Not on file     Transportation needs     Medical: Not on file     Non-medical: Not on file   Tobacco Use     Smoking status: Former Smoker     Packs/day: 0.00     Smokeless tobacco: Never Used   Substance and Sexual Activity     Alcohol use: Not Currently     Drug use: Never     Sexual activity: Yes     Partners: Male   Lifestyle     Physical activity     Days per week: Not on file     Minutes per session: Not on file      "Stress: Not on file   Relationships     Social connections     Talks on phone: Not on file     Gets together: Not on file     Attends Zoroastrianism service: Not on file     Active member of club or organization: Not on file     Attends meetings of clubs or organizations: Not on file     Relationship status: Not on file     Intimate partner violence     Fear of current or ex partner: Not on file     Emotionally abused: Not on file     Physically abused: Not on file     Forced sexual activity: Not on file   Other Topics Concern     Not on file   Social History Narrative     Not on file      Current Outpatient Medications   Medication Sig Dispense Refill     aspirin 81 mg chewable tablet Chew 81 mg daily.       CALCIUM-VITAMIN D3 ORAL Take 1 tablet by mouth daily.       clonazePAM (KLONOPIN) 0.5 MG tablet Take 1 tablet (0.5 mg total) by mouth at bedtime as needed for anxiety. 30 tablet 0     cyanocobalamin 1000 MCG tablet Take 1,000 mcg by mouth daily.       FLUoxetine (PROZAC) 20 MG capsule Take 2 capsules (40 mg total) by mouth daily. 180 capsule 3     losartan (COZAAR) 25 MG tablet Take 1 tablet (25 mg total) by mouth daily. 90 tablet 0     buPROPion (WELLBUTRIN XL) 150 MG 24 hr tablet Take 1 tablet (150 mg total) by mouth every morning. 90 tablet 3     No current facility-administered medications for this visit.       Objective:   Vital Signs:   Visit Vitals  /72 (Patient Site: Right Arm, Patient Position: Sitting)   Pulse 80   Temp 97.9  F (36.6  C)   Ht 4' 9\" (1.448 m)   Wt 113 lb (51.3 kg)   SpO2 98%   BMI 24.45 kg/m           VisionScreening:  No exam data present     PHYSICAL EXAM  General:  Patient is alert and in no apparent distress.  Neck:  Supple with no adenopathy or thyroid abnormality noted.  Cardiovascular:  Regular rate and rhythm, normal S1/S2, no murmurs, rubs, or gallop.  Pulmonary:  Lungs are clear to auscultation bilaterally with normal respiratory effort.  Gastrointestinal:  Abdomen is soft, " non-tender, non-distended, with no organomegaly, rebound or guarding.  Extremities:  No joint abnormalities with no LE edema.  Strong dorsalis pedis pulses.  Neurologic Cranial nerves are intact.  No focal deficits.  Psychiatric:  Pleasant, no confusion or agitation   Skin:  Warm and well perfused with no rashes or abnormalities.  Breast: Normal breast tissue with no masses or abnormalities.      Assessment Results 12/1/2020   Activities of Daily Living 1 - Full function   Instrumental Activities of Daily Living No help needed   Mini Cog Total Score 5   Some recent data might be hidden     A Mini-Cog score of 0-2 suggests the possibility of dementia, score of 3-5 suggests no dementia    Identified Health Risks:     She is at risk for lack of exercise and has been provided with information to increase physical activity for the benefit of her well-being.  The patient reports that she has difficulty with activities of daily living.   The patient was provided with written information regarding signs of hearing loss.  The patient's PHQ-9 score is consistent with mild depression.  She is at risk for falling and has been provided with information to reduce the risk of falling at home.  Information regarding advance directives (living lim), including where she can download the appropriate form, was provided to the patient via the AVS.

## 2021-06-13 NOTE — TELEPHONE ENCOUNTER
Medication Question or Clarification  Who is calling: patient  What medication are you calling about (include dose and sig)?:   losartan (COZAAR) 25 MG tablet  25 mg, Oral, DAILY       Who prescribed the medication?: Annie Cunningham MD  What is your question/concern?: Patient is aware she needs an appointment.  She is going to call back this evening to get through to scheduling.  The question is will Dr Cunningham bridge the losartan  appointment.  Patient is out of losartan all together.  Please advise.   [facility]  Okay to leave a detailed message?: Yes

## 2021-06-13 NOTE — TELEPHONE ENCOUNTER
Medication Request  Medication name: FLuxoetine  Requested [facility]: [facility]  Reason for request: The order is historic  Patient is asking for refill. She is just about out of medication.   Please advise.   When did you use medication last?:  today  Patient offered appointment:  yes  Okay to leave a detailed message: yes

## 2021-06-13 NOTE — TELEPHONE ENCOUNTER
Spoke with the patient and relayed message below from Dr. Dawson.  She verbalized understanding and had no further questions at this time.    Refill has been set up for Dr. Dawson to review per message below.  Kely MCCANN, AMISHA/CMT....................10:15 AM

## 2021-06-13 NOTE — TELEPHONE ENCOUNTER
Patient is scheduled for 12/01/20, ok to set up prescription for patient? Thank you.    Maame Serra, CMA

## 2021-06-14 NOTE — TELEPHONE ENCOUNTER
Thank you for responding to her vaccine questions.  I recommend a virtual visit to discuss her lab questions.  Thanks.

## 2021-06-14 NOTE — TELEPHONE ENCOUNTER
Prescription Monitoring Program activity reviewed with no discrepancies noted.      Last fill per : 12/1/2020  Quantity/days supply: 30    Controlled Substance Agreement on file: No  Date:     Last office visit with provider:  10/16/2020 Annie Cunningham MD    Please advise.

## 2021-06-14 NOTE — TELEPHONE ENCOUNTER
Controlled Substance Refill Request  Medication Name:   Requested Prescriptions     Pending Prescriptions Disp Refills     clonazePAM (KLONOPIN) 0.5 MG tablet 30 tablet 0     Sig: Take 1 tablet (0.5 mg total) by mouth at bedtime as needed for anxiety.     Date Last Fill: 12/1/20  [facility]  Submit electronically to pharmacy  Controlled Substance Agreement on file:   Encounter-Level CSA Scan Date:    There are no encounter-level csa scan date.        Last office visit:  12/1/20

## 2021-06-15 NOTE — TELEPHONE ENCOUNTER
Refill Approved    Rx renewed per Medication Renewal Policy. Medication was last renewed on 11/18/20.    Vijay Tyson, [facility] Triage/Med Refill 2/19/2021     Requested Prescriptions   Pending Prescriptions Disp Refills     losartan (COZAAR) 25 MG tablet 90 tablet 0     Sig: Take 1 tablet (25 mg total) by mouth daily.       Angiotensin Receptor Blocker Protocol Passed - 2/19/2021  7:14 AM        Passed - PCP or prescribing provider visit in past 12 months       Last office visit with prescriber/PCP: 10/16/2020 Annie Cunningham MD OR same dept: Visit date not found OR same specialty: 10/16/2020 Annie Cunningham MD  Last physical: 12/1/2020 Last MTM visit: Visit date not found   Next visit within 3 mo: Visit date not found  Next physical within 3 mo: Visit date not found  Prescriber OR PCP: Annie Cunningham MD  Last diagnosis associated with med order: 1. Hypertension  - losartan (COZAAR) 25 MG tablet; Take 1 tablet (25 mg total) by mouth daily.  Dispense: 90 tablet; Refill: 0    If protocol passes may refill for 12 months if within 3 months of last provider visit (or a total of 15 months).             Passed - Serum potassium within the past 12 months     Lab Results   Component Value Date    Potassium 4.1 10/16/2020             Passed - Blood pressure filed in past 12 months     BP Readings from Last 1 Encounters:   12/01/20 110/72             Passed - Serum creatinine within the past 12 months     Creatinine   Date Value Ref Range Status   10/16/2020 1.16 (H) 0.60 - 1.10 mg/dL Final

## 2021-06-15 NOTE — TELEPHONE ENCOUNTER
Prescription Monitoring Program activity reviewed with no discrepancies noted.      Last fill per : 2/1/20  Quantity/days supply: 30 tab x 30 days     Controlled Substance Agreement on file: No  Date: NA    Last office visit with provider:  10/16/2020 Annie Cunningham MD    Please advise.

## 2021-06-15 NOTE — TELEPHONE ENCOUNTER
Refill request received from [facility] for Losartan 25 mg.    Requested Prescriptions     Pending Prescriptions Disp Refills     losartan (COZAAR) 25 MG tablet 90 tablet 0     Sig: Take 1 tablet (25 mg total) by mouth daily.

## 2021-06-15 NOTE — PROGRESS NOTES
"Office Visit - Follow up    Mariya Sorensen   73 y.o. female    Date of Visit: 1/18/2018    Chief Complaint   Patient presents with     Cough     Congestion, sore ribs, fever-subsided since Saturday       Subjective: Smitha is here primarily with symptoms of cough but does have significant symptoms of mood disorder and anxiety    Several day history of upper respiratory congestion with some muscle aches and fatigue.  Denies fever or other systemic symptoms but does have a cough producing modest amounts of phlegm.  Denies dyspnea.    PHQ 9 is abnormal with many questions answered at level 3 and a total score of about 15.    Recently underwent extensive physical exam in [place] at the [facility] notes from visit with her primary physician Dr. Vijay Quinones are reviewed    Diagnoses and conclusions noted.  Losartan 25 mg was added to her regimen.  Mild history of renal insufficiency although her creatinines have been stable in the range of 1.0-1.2.    Additional medical concerns reviewed    She requests referral both for colonoscopy and if necessary mammography        ROS: A comprehensive review of systems was performed and was otherwise negative except as mentioned above.     Exam  Alert and oriented but appears a bit tearful and at times anxious and despondent.  No skin abnormalities she is not dyspneic at rest blood pressure as noted head and neck unremarkable lungs minimal rhonchi   /68  Pulse 76  Temp 97.8  F (36.6  C)  Ht 4' 9\" (1.448 m)  Wt 130 lb (59 kg)  SpO2 98%  BMI 28.13 kg/m2    Assessment and Plan  Mild depressive/mood disorder I agree that CBT may be of some benefit and will refer to department of psychology.  Continues on higher dose of Celexa we will review this.    Blood pressures for the most part have been normal but was elevated in [place] we will continue with current dose of losartan and continue to follow this.    Intermittent problems with headaches and mild memory disorder " likely related in part to anxiety    URI with cough which we will treat symptomatically with promethazine with codeine also plan treatment with a azithromycin because of suspected bronchitis    I should see her again in about 2 weeks I would like to reassess her renal function rechecked blood pressure assess her anxiety/mood disorder and follow-up regarding arrangements for CBT and other primary health care needs25    Mariya was seen today for cough.    Diagnoses and all orders for this visit:    Depressed  -     Ambulatory referral to Psychology    Visit for screening mammogram  -     Mammo Screening Bilateral; Future    Screen for colon cancer  -     Ambulatory referral for Colonoscopy    Anxiety  -     Ambulatory referral to Psychology    Hypertension    Cough    URI (upper respiratory infection)    Other orders  -     losartan (COZAAR) 25 MG tablet; Take 1 tablet (25 mg total) by mouth daily.  -     citalopram (CELEXA) 40 MG tablet; Take 1 tablet (40 mg total) by mouth daily. TAKE 1 TABLET BY MOUTH DAILY  -     azithromycin (ZITHROMAX Z-SHIVA) 250 MG tablet; Take 2 tablets (500 mg) on  Day 1,  followed by 1 tablet (250 mg) once daily on Days 2 through 5.  -     promethazine-codeine (PHENERGAN WITH CODEINE) 6.25-10 mg/5 mL syrup; Take 5 mL by mouth every 4 (four) hours as needed for cough.          Time: total time spent with the patient was 25 minutes of which >50% was spent in counseling and coordination of care        Allergies   Allergen Reactions     Penicillins Rash       Medications :  Prior to Admission medications    Medication Sig Start Date End Date Taking? Authorizing Provider   buPROPion (WELLBUTRIN SR) 150 MG 12 hr tablet TAKE 3 TABLETS BY MOUTH EVERY DAY 1/13/18  Yes Syed Adams MD   CALCIUM CARB/CA GLUC/VIT D3 (CALCIUM CARB &GLUCONATE-VIT D3 ORAL) Take by mouth.   Yes PROVIDER, HISTORICAL   cholecalciferol, vitamin D3, (VITAMIN D3) 2,000 unit capsule Take 1,000 Units by mouth daily.   Yes  PROVIDER, HISTORICAL   citalopram (CELEXA) 40 MG tablet Take 1 tablet (40 mg total) by mouth daily. TAKE 1 TABLET BY MOUTH DAILY 18  Yes Syed Adams MD   clonazePAM (KLONOPIN) 0.5 MG tablet TAKE 1 TABLET BY MOUTH EVERY DAY AS NEEDED 17  Yes Syed Adams MD   cyanocobalamin (VITAMIN B-12) 100 MCG tablet Take 100 mcg by mouth daily.   Yes PROVIDER, HISTORICAL   triamterene-hydrochlorothiazide (DYAZIDE) 37.5-25 mg per capsule TAKE 1 CAPSULE BY MOUTH DAILY 17  Yes Syed Adams MD   vitamin E 400 UNIT capsule Take 400 Units by mouth daily.   Yes PROVIDER, HISTORICAL   citalopram (CELEXA) 20 MG tablet TAKE 1 TABLET BY MOUTH DAILY 17 Yes Syed Adams MD   azithromycin (ZITHROMAX Z-SHIVA) 250 MG tablet Take 2 tablets (500 mg) on  Day 1,  followed by 1 tablet (250 mg) once daily on Days 2 through 5. 18  Syed Adams MD   losartan (COZAAR) 25 MG tablet Take 1 tablet (25 mg total) by mouth daily. 18   Syed Adams MD   promethazine-codeine (PHENERGAN WITH CODEINE) 6.25-10 mg/5 mL syrup Take 5 mL by mouth every 4 (four) hours as needed for cough. 18   Syed Adams MD   alendronate (FOSAMAX) 70 MG tablet TAKE 1 TABLET BY MOUTH WEEKLY. TAKE 30 MINUTES BEFORE FIRST FOOD WITH 8 OUNCES OF WATER; AVOID LYING DOWN FOR 30 MINUTES. 17  Syed Adams MD   buPROPion (WELLBUTRIN SR) 150 MG 12 hr tablet TAKE 3 TABLETS BY MOUTH EVERY DAY 17  Syed Adams MD   buPROPion (WELLBUTRIN SR) 150 MG 12 hr tablet TAKE 3 TABLETS BY MOUTH EVERY DAY 18  Syed Adams MD        Past Medical History: No past medical history on file.    Past Surgical History:   Past Surgical History:   Procedure Laterality Date     VT  DELIVERY ONLY      Description:  Section;  Recorded: 2010;  Comments: x2     VT CORRJ HALLUX VALGUS W/SESMDC W/2 OSTEOT      Description: Hallux Valgus (Bunion) Correction;  Recorded: 2010;      MT REMOVAL OF TONSILS,<13 Y/O      Description: Tonsillectomy;  Recorded: 01/25/2010;       Social History:   Social History     Social History     Marital status:      Spouse name: N/A     Number of children: N/A     Years of education: N/A     Occupational History     Not on file.     Social History Main Topics     Smoking status: Former Smoker     Smokeless tobacco: Never Used     Alcohol use Not on file     Drug use: Not on file     Sexual activity: Not on file     Other Topics Concern     Not on file     Social History Narrative       Family History: No family history on file.      Syed Adams MD

## 2021-06-15 NOTE — PROGRESS NOTES
"Office Visit - Follow up    Mariya Sorensen   73 y.o. female    Date of Visit: 2018    Chief Complaint   Patient presents with     Mass     LLQ with bruising and some pain for 3 weeks       Subjective: Smitha was last seen here 2 weeks ago with symptoms of bronchitis in addition to mild mood symptoms.  She states her cough is much improved current blood pressures have been stable she does plan to make an appointment with a psychologist for consideration of CBT    She has noted no symptoms of abdominal discomfort however noted a prominence and large ecchymosis involving the left lower quadrant.  She noted this about 3 or 4 days ago.  She has felt mild discomfort over what appears to be a palpable mass in the left lower quadrant.  Previous history of  ×2 with a longitudinal incision just below the umbilicus.    Previous surgical history only includes the  she is otherwise healthy and has had no major illnesses or surgery    She denies change in bowel habits she denies dysuria and otherwise feels well    ROS: A comprehensive review of systems was performed and was otherwise negative except as mentioned above.     Exam  She is in no acute distress.    She does have a an unusual ecchymosis involving the left lower abdominal wall.  This appears to be confined along fascial lines in the left lower quadrant it is a instituted no ecchymosis in the midline and then spreads laterally in a branching type distribution.  I do appreciate an egg-sized discomfort/mass in the left lower quadrant which is nontender I am unable to reduce this.  Bowel sounds normal no evidence of splenomegaly or of hepatomegaly   /70  Pulse 77  Ht 4' 9\" (1.448 m)  Wt 136 lb (61.7 kg)  BMI 29.43 kg/m2    Assessment and Plan  Ecchymosis left lower quadrant with palpable mass may represent hernia versus neoplasm.  CBC and metabolic profile are ordered.  Will obtain consultation by Dr. Garret Valdovinos this afternoon CT scan of " abdomen and pelvis have been scheduled prior to this    Mariya was seen today for mass.    Diagnoses and all orders for this visit:    Screening breast examination    Screen for colon cancer  -     Ambulatory referral for Colonoscopy    Abdominal hernia  -     CT Abdomen Pelvis With Oral With IV Contrast; Future  -     HM2(CBC w/o Differential); Future  -     Comprehensive Metabolic Panel; Future  -     HM2(CBC w/o Differential)  -     Comprehensive Metabolic Panel    Traumatic ecchymosis of abdominal wall    Other orders  -     Cancel: Mammo Screening Bilateral; Future          Time: total time spent with the patient was 30 minutes of which >50% was spent in counseling and coordination of care        Allergies   Allergen Reactions     Penicillins Rash       Medications :  Prior to Admission medications    Medication Sig Start Date End Date Taking? Authorizing Provider   buPROPion (WELLBUTRIN SR) 150 MG 12 hr tablet TAKE 3 TABLETS BY MOUTH EVERY DAY 1/13/18  Yes Syed Adams MD   CALCIUM CARB/CA GLUC/VIT D3 (CALCIUM CARB &GLUCONATE-VIT D3 ORAL) Take by mouth.   Yes PROVIDER, HISTORICAL   cholecalciferol, vitamin D3, (VITAMIN D3) 2,000 unit capsule Take 1,000 Units by mouth daily.   Yes PROVIDER, HISTORICAL   clonazePAM (KLONOPIN) 0.5 MG tablet Take 1 tablet (0.5 mg total) by mouth 2 (two) times a day as needed for anxiety. 1/29/18  Yes Syed Adams MD   cyanocobalamin (VITAMIN B-12) 100 MCG tablet Take 100 mcg by mouth daily.   Yes PROVIDER, HISTORICAL   losartan (COZAAR) 25 MG tablet Take 1 tablet (25 mg total) by mouth daily. 1/18/18  Yes Syed Adams MD   triamterene-hydrochlorothiazide (DYAZIDE) 37.5-25 mg per capsule TAKE 1 CAPSULE BY MOUTH DAILY 11/13/17  Yes Syed Adams MD   vitamin E 400 UNIT capsule Take 400 Units by mouth daily.   Yes PROVIDER, HISTORICAL   citalopram (CELEXA) 40 MG tablet Take 1 tablet (40 mg total) by mouth daily. TAKE 1 TABLET BY MOUTH DAILY 1/18/18   Syed Adams MD    promethazine-codeine (PHENERGAN WITH CODEINE) 6.25-10 mg/5 mL syrup Take 5 mL by mouth every 4 (four) hours as needed for cough. 18  Syed Adams MD        Past Medical History: No past medical history on file.    Past Surgical History:   Past Surgical History:   Procedure Laterality Date     MS  DELIVERY ONLY      Description:  Section;  Recorded: 2010;  Comments: x2     MS CORRJ HALLUX VALGUS W/SESMDC W/2 OSTEOT      Description: Hallux Valgus (Bunion) Correction;  Recorded: 2010;     MS REMOVAL OF TONSILS,<13 Y/O      Description: Tonsillectomy;  Recorded: 2010;       Social History:   Social History     Social History     Marital status:      Spouse name: N/A     Number of children: N/A     Years of education: N/A     Occupational History     Not on file.     Social History Main Topics     Smoking status: Former Smoker     Smokeless tobacco: Never Used     Alcohol use Not on file     Drug use: Not on file     Sexual activity: Not on file     Other Topics Concern     Not on file     Social History Narrative       Family History: No family history on file.      Syed Adams MD

## 2021-06-15 NOTE — TELEPHONE ENCOUNTER
Controlled Substance Refill Request  Medication Name:   Requested Prescriptions     Pending Prescriptions Disp Refills     clonazePAM (KLONOPIN) 0.5 MG tablet 30 tablet 0     Sig: Take 1 tablet (0.5 mg total) by mouth at bedtime as needed for anxiety.     Date Last Fill: 2/1/21  Requested Pharmacy: [facility]  Submit electronically to pharmacy  Controlled Substance Agreement on file:   Encounter-Level CSA Scan Date:    There are no encounter-level csa scan date.        Last office visit:  12/1/20  Cristina Mcmullen RN, MA  [facility]    Triage Nurse Advisor

## 2021-06-15 NOTE — TELEPHONE ENCOUNTER
Controlled Substance Refill Request  Medication Name:   Requested Prescriptions     Pending Prescriptions Disp Refills     clonazePAM (KLONOPIN) 0.5 MG tablet 30 tablet 0     Sig: Take 1 tablet (0.5 mg total) by mouth at bedtime as needed for anxiety.     Date Last Fill: 2/1/21  Requested Pharmacy: None selected  Submit electronically to pharmacy  Controlled Substance Agreement on file:   Encounter-Level CSA Scan Date:    There are no encounter-level csa scan date.        Last office visit:  12/1/20

## 2021-06-17 NOTE — PATIENT INSTRUCTIONS - HE
Patient Instructions by Annie Cunningham MD at 10/25/2019  3:20 PM     Author: Annie Cunningham MD Service: -- Author Type: Physician    Filed: 10/25/2019  3:53 PM Encounter Date: 10/25/2019 Status: Signed    : Annie Cunningham MD (Physician)         Patient Education     Exercise for a Healthier Heart  You may wonder how you can improve the health of your heart. If youre thinking about exercise, youre on the right track. You dont need to become an athlete, but you do need a certain amount of brisk exercise to help strengthen your heart. If you have been diagnosed with a heart condition, your doctor may recommend exercise to help stabilize your condition. To help make exercise a habit, choose safe, fun activities.       Be sure to check with your health care provider before starting an exercise program.    Why exercise?  Exercising regularly offers many healthy rewards. It can help you do all of the following:    Improve your blood cholesterol levels to help prevent further heart trouble    Lower your blood pressure to help prevent a stroke or heart attack    Control diabetes, or reduce your risk of getting this disease    Improve your heart and lung function    Reach and maintain a healthy weight    Make your muscles stronger and more limber so you can stay active    Prevent falls and fractures by slowing the loss of bone mass (osteoporosis)    Manage stress better  Exercise tips  Ease into your routine. Set small goals. Then build on them.  Exercise on most days. Aim for a total of 150 or more minutes of moderate to  vigorous intensity activity each week. Consider 40 minutes, 3 to 4 times a week. For best results, activity should last for 40 minutes on average. It is OK to work up to the 40 minute period over time. Examples of moderate-intensity activity is walking one mile in 15 minutes or 30 to 45 minutes of yard work.  Step up your daily activity level. Along with your exercise program, try being  more active throughout the day. Walk instead of drive. Do more household tasks or yard work.  Choose one or more activities you enjoy. Walking is one of the easiest things you can do. You can also try swimming, riding a bike, or taking an exercise class.  Stop exercising and call your doctor if you:    Have chest pain or feel dizzy or lightheaded    Feel burning, tightness, pressure, or heaviness in your chest, neck, shoulders, back, or arms    Have unusual shortness of breath    Have increased joint or muscle pain    Have palpitations or an irregular heartbeat      2402-9402 [employer]. [address] [place] [id number]. All rights reserved. This information is not intended as a substitute for professional medical care. Always follow your healthcare professional's instructions.         Patient Education   Understanding RFEyeD MyPlate  The USDA (US Department of Agriculture) has guidelines to help you make healthy food choices. These are called MyPlate. MyPlate shows the food groups that make up healthy meals using the image of a place setting. Before you eat, think about the healthiest choices for what to put onto your plate or into your cup or bowl. To learn more about building a healthy plate, visit www.choosemyplate.gov.       The Food Groups    Fruits: Any fruit or 100% fruit juice counts as part of the Fruit Group. Fruits may be fresh, canned, frozen, or dried, and may be whole, cut-up, or pureed. Make half your plate fruits and vegetables.    Vegetables: Any vegetable or 100% vegetable juice counts as a member of the Vegetable Group. Vegetables may be fresh, frozen, canned, or dried. They can be served raw or cooked and may be whole, cut-up, or mashed. Make half your plate fruits and vegetables.     Grains: All foods made from grains are part of the Grains Group. These include wheat, rice, oats, cornmeal, and barley such as bread, pasta, oatmeal, cereal, tortillas, and grits. Grains  should be no more than a quarter of your plate. At least half of your grains should be whole grains.    Protein: This group includes meat, poultry, seafood, beans and peas, eggs, processed soy products (like tofu), nuts (including nut butters), and seeds. Make protein choices no more than a quarter of your plate. Meat and poultry choices should be lean or low fat.    Dairy: All fluid milk products and foods made from milk that contain calcium, like yogurt and cheese are part of the Dairy Group. (Foods that have little calcium, such as cream, butter, and cream cheese, are not part of the group.) Most dairy choices should be low-fat or fat-free.    Oils: These are fats that are liquid at room temperature. They include canola, corn, olive, soybean, and sunflower oil. Foods that are mainly oil include mayonnaise, certain salad dressings, and soft margarines. You should have only 5 to 7 teaspoons of oils a day. You probably already get this much from the food you eat.  Use [employer] to Help Build Your Meals  The SuperTracker can help you plan and track your meals and activity. You can look up individual foods to see or compare their nutritional value. You can get guidelines for what and how much you should eat. You can compare your food choices. And you can assess personal physical activities and see ways you can improve. Go to www.[employer].gov/supertracker/.    2531-9647 [employer]. [address] [place] [address]. All rights reserved. This information is not intended as a substitute for professional medical care. Always follow your healthcare professional's instructions.           Patient Education   Signs of Hearing Loss  Hearing loss is a problem shared by many people. In fact, it is one of the most common health conditions, particularly as people age. Most people over age 65 have some hearing loss, and by age 80, almost everyone does. Because hearing loss usually occurs slowly over  the years, you may not realize your hearing ability has gotten worse.       Have your hearing checked  Contact your [facility] care provider if you:    Have to strain to hear normal conversation.    Have to watch other peoples faces very carefully to follow what theyre saying.    Need to ask people to repeat what theyve said.    Often misunderstand what people are saying.    Turn the volume of the television or radio up so high that others complain.    Feel that people are mumbling when theyre talking to you.    Find that the effort to hear leaves you feeling tired and irritated.    Notice, when using the phone, that you hear better with 1 ear than the other.    6132-0581 [employer]. [address] [place] [address], [place] [address]. All rights reserved. This information is not intended as a substitute for professional medical care. Always follow your healthcare professional's instructions.         Patient Education   Depression and Suicide in Older Adults  Nearly 2 million older Americans have some type of depression. Sadly, some of them even take their own lives. Yet depression among older adults is often ignored. Learn the warning signs. You may help spare a loved one needless pain. You may also save a life.       What Is Depression?  Depression is a mood disorder that affects the way you think and feel. The most common symptom is a feeling of deep sadness. People who are depressed also may seem tired and listless. And nothing seems to give them pleasure. Its normal to grieve or be sad sometimes. But sadness lessens or passes with time. Depression rarely goes away or improves on its own. Other symptoms of depression are:    Sleeping more or less than normal    Eating more or less than normal    Having headaches, stomachaches, or other pains that dont go away    Feeling nervous, empty, or worthless    Crying a great deal    Thinking or talking about suicide or death    Feeling confused or forgetful  What Causes  It?  The causes of depression arent fully known. Certain chemicals in the brain play a role. Depression does run in families. And life stresses can also trigger depression in some people. That may be the case with older adults. They often face great burdens, such as the death of friends or a spouse. They may have failing health. And they are more likely to be alone, lonely, or poor.  How You Can Help  Often, depressed people may not want to ask for help. When they do, they may be ignored. Or, they may receive the wrong treatment. You can help by showing parents and older friends love and support. If they seem depressed, help them find the right treatment. Talk to your doctor. Or contact a local mental health center, social service agency, or hospital. With modern treatment, no one has to suffer from depression.  Resources:    National [place] of Mental Health  680.614.2695  www.nimh.nih.gov    National [place] on Mental Illness  361.294.8572  www.jaylyn.org    M[facility]  269.554.2155  www.[facility].org    National Suicide Hotline  746.256.2443 (800-SUICIDE)      0655-5353 [employer]. [address] [place] [address]. All rights reserved. This information is not intended as a substitute for professional medical care. Always follow your healthcare professional's instructions.         Patient Education   Understanding Advance Care Planning  Advance care planning is the process of deciding ones own future medical care. It helps ensure that if you cant speak for yourself, your wishes can still be carried out. The plan is a series of legal documents that note a persons wishes. The documents vary by state. Advance care planning may be done when a person has a serious illness that is expected to get worse. It may be done before major surgery. And it can help you and your family be prepared in case of a major illness or injury. Advance care planning helps with making decisions at these  times.       A health care proxy is a person who acts as the voice of a patient when the patient cant speak for himself or herself. The name of this role varies by state. It may be called a Durable Medical Power of  or Durable Power of  for Healthcare. It may be called an agent, surrogate, or advocate. Or it may be called a representative or decision maker. It is an official duty that is identified by a legal document. The document also varies by state.    Why Is Advance Care Planning Important?  If a person communicates their healthcare wishes:    They will be given medical care that matches their values and goals.    Their family members will not be forced to make decisions in a crisis with no guidance.  Creating a Plan  Making an advance care plan is often done in 3 steps:    Thinking about ones wishes. To create an advance care plan, you should think about what kind of medical treatment you would want if you lose the ability to communicate. Are there any situations in which you would refuse or stop treatment? Are there therapies you would want or not want? And whom do you want to make decisions for you? There are many places to learn more about how to plan for your care. Ask your doctor or  for resources.    Picking a health care proxy. This means choosing a trusted person to speak for you only when you cant speak for yourself. When you cannot make medical decisions, your proxy makes sure the instructions in your advance care plan are followed. A proxy does not make decisions based on his or her own opinions. They must put aside those opinions and values if needed, and carry out your wishes.    Filling out the legal documents. There are several kinds of legal documents for advance care planning. Each one tells health care providers your wishes. The documents may vary by state. They must be signed and may need to be witnessed or notarized. You can cancel or change them whenever you  wish. Depending on your state, the documents may include a Healthcare Proxy form, Living Will, Durable Medical Power of , Advance Directive, or others.  The Familys Role  The best help a family can give is to support their loved ones wishes. Open and honest communication is vital. Family should express any concerns they have about the patients choices while the patient can still make decisions.    4945-6309 [employer]. [address] [place] [address], [place] [address]. All rights reserved. This information is not intended as a substitute for professional medical care. Always follow your healthcare professional's instructions.         Also, [facility] [place] offers a free, downloadable health care directive that allows you to share your treatment choices and personal preferences if you cannot communicate your wishes. It also allows you to appoint another person (called a health care agent) to make health care decisions if you are unable to do so. You can download an advance directive by going here: [email or web address]     Patient Education   Personalized Prevention Plan  You are due for the preventive services outlined below.  Your care team is available to assist you in scheduling these services.  If you have already completed any of these items, please share that information with your care team to update in your medical record.  Health Maintenance   Topic Date Due   ? DEPRESSION FOLLOW UP  1945   ? HEPATITIS C SCREENING  1945   ? ZOSTER VACCINES (1 of 2) 01/09/1996   ? MEDICARE ANNUAL WELLNESS VISIT  01/09/2011   ? DXA SCAN  01/09/2011   ? COLONOSCOPY  10/20/2015   ? FALL RISK ASSESSMENT  10/25/2020   ? ADVANCE CARE PLANNING  06/07/2021   ? MAMMOGRAM  10/14/2021   ? TD 18+ HE  01/25/2022   ? PNEUMOCOCCAL IMMUNIZATION 65+ LOW/MEDIUM RISK  Completed   ? INFLUENZA VACCINE RULE BASED  Completed

## 2021-06-18 NOTE — PATIENT INSTRUCTIONS - HE
Patient Instructions by Annie Cunningham MD at 12/1/2020  8:00 AM     Author: Annie Cunningham MD Service: -- Author Type: Physician    Filed: 12/1/2020  8:12 AM Encounter Date: 12/1/2020 Status: Signed    : Annie Cunningham MD (Physician)         Patient Education     Exercise for a Healthier Heart  You may wonder how you can improve the health of your heart. If youre thinking about exercise, youre on the right track. You dont need to become an athlete, but you do need a certain amount of brisk exercise to help strengthen your heart. If you have been diagnosed with a heart condition, your doctor may recommend exercise to help stabilize your condition. To help make exercise a habit, choose safe, fun activities.       Be sure to check with your health care provider before starting an exercise program.    Why exercise?  Exercising regularly offers many healthy rewards. It can help you do all of the following:    Improve your blood cholesterol levels to help prevent further heart trouble    Lower your blood pressure to help prevent a stroke or heart attack    Control diabetes, or reduce your risk of getting this disease    Improve your heart and lung function    Reach and maintain a healthy weight    Make your muscles stronger and more limber so you can stay active    Prevent falls and fractures by slowing the loss of bone mass (osteoporosis)    Manage stress better  Exercise tips  Ease into your routine. Set small goals. Then build on them.  Exercise on most days. Aim for a total of 150 or more minutes of moderate to  vigorous intensity activity each week. Consider 40 minutes, 3 to 4 times a week. For best results, activity should last for 40 minutes on average. It is OK to work up to the 40 minute period over time. Examples of moderate-intensity activity is walking one mile in 15 minutes or 30 to 45 minutes of yard work.  Step up your daily activity level. Along with your exercise program, try being more  active throughout the day. Walk instead of drive. Do more household tasks or yard work.  Choose one or more activities you enjoy. Walking is one of the easiest things you can do. You can also try swimming, riding a bike, or taking an exercise class.  Stop exercising and call your doctor if you:    Have chest pain or feel dizzy or lightheaded    Feel burning, tightness, pressure, or heaviness in your chest, neck, shoulders, back, or arms    Have unusual shortness of breath    Have increased joint or muscle pain    Have palpitations or an irregular heartbeat      8537-7213 [employer]. [address] [place] [address]. All rights reserved. This information is not intended as a substitute for professional medical care. Always follow your healthcare professional's instructions.         Patient Education   Activities of Daily Living  Your Health Risk Assessment indicates you have difficulties with activities of daily living such as eating, getting dressed, grooming, bathing, walking, or using the toilet. Please make a follow up appointment for us to address this issue in more detail.     Patient Education   Signs of Hearing Loss  Hearing loss is a problem shared by many people. In fact, it is one of the most common health conditions, particularly as people age. Most people over age 65 have some hearing loss, and by age 80, almost everyone does. Because hearing loss usually occurs slowly over the years, you may not realize your hearing ability has gotten worse.       Have your hearing checked  Contact your [facility] care provider if you:    Have to strain to hear normal conversation.    Have to watch other peoples faces very carefully to follow what theyre saying.    Need to ask people to repeat what theyve said.    Often misunderstand what people are saying.    Turn the volume of the television or radio up so high that others complain.    Feel that people are mumbling when theyre talking to  you.    Find that the effort to hear leaves you feeling tired and irritated.    Notice, when using the phone, that you hear better with 1 ear than the other.    8468-3443 [employer]. [address] [place] [address], [place] [address]. All rights reserved. This information is not intended as a substitute for professional medical care. Always follow your healthcare professional's instructions.         Patient Education   Depression and Suicide in Older Adults  Nearly 2 million older Americans have some type of depression. Sadly, some of them even take their own lives. Yet depression among older adults is often ignored. Learn the warning signs. You may help spare a loved one needless pain. You may also save a life.       What Is Depression?  Depression is a mood disorder that affects the way you think and feel. The most common symptom is a feeling of deep sadness. People who are depressed also may seem tired and listless. And nothing seems to give them pleasure. Its normal to grieve or be sad sometimes. But sadness lessens or passes with time. Depression rarely goes away or improves on its own. Other symptoms of depression are:    Sleeping more or less than normal    Eating more or less than normal    Having headaches, stomachaches, or other pains that dont go away    Feeling nervous, empty, or worthless    Crying a great deal    Thinking or talking about suicide or death    Feeling confused or forgetful  What Causes It?  The causes of depression arent fully known. Certain chemicals in the brain play a role. Depression does run in families. And life stresses can also trigger depression in some people. That may be the case with older adults. They often face great burdens, such as the death of friends or a spouse. They may have failing health. And they are more likely to be alone, lonely, or poor.  How You Can Help  Often, depressed people may not want to ask for help. When they do, they may be ignored. Or, they  may receive the wrong treatment. You can help by showing parents and older friends love and support. If they seem depressed, help them find the right treatment. Talk to your doctor. Or contact a local mental health center, social service agency, or hospital. With modern treatment, no one has to suffer from depression.  Resources:    National [place] of Mental Health  406.885.1308  www.nimh.nih.gov    National [place] on Mental Illness  548.621.6777  www.jaylyn.org    Mental [facility]  988.894.5291  www.[facility].org    [place] Suicide Hotline  713.625.1314 (800-SUICIDE)      7411-1048 [employer]. [address] [place], [place] [id number]. All rights reserved. This information is not intended as a substitute for professional medical care. Always follow your healthcare professional's instructions.         Patient Education   Preventing Falls in the Home  As you get older, falls are more likely. Thats because your reaction time slows. Your muscles and joints may also get stiffer, making them less flexible. Illness, medications, and vision changes can also affect your balance. A fall could leave you unable to live on your own. To make your home safer, follow these tips:    Floors    Put nonskid pads under area rugs.    Remove throw rugs.    Replace worn floor coverings.    Tack carpets firmly to each step on carpeted stairs. Put nonskid strips on the edges of uncarpeted stairs.    Keep floors and stairs free of clutter and cords.    Arrange furniture so there are clear pathways.    Clean up any spills right away.    Bathrooms    Install grab bars in the tub or shower.    Apply nonskid strips or put a nonskid rubber mat in the tub or shower.    Sit on a bath chair to bathe.    Use bathmats with nonskid backing.    Lighting    Keep a flashlight in each room.    Put a nightlight along the pathway between the bedroom and the bathroom.    8866-9770 [employer]. [address] [place] [address]  [place] [address]. All rights reserved. This information is not intended as a substitute for professional medical care. Always follow your healthcare professional's instructions.         Patient Education   Understanding Advance Care Planning  Advance care planning is the process of deciding ones own future medical care. It helps ensure that if you cant speak for yourself, your wishes can still be carried out. The plan is a series of legal documents that note a persons wishes. The documents vary by state. Advance care planning may be done when a person has a serious illness that is expected to get worse. It may be done before major surgery. And it can help you and your family be prepared in case of a major illness or injury. Advance care planning helps with making decisions at these times.       A health care proxy is a person who acts as the voice of a patient when the patient cant speak for himself or herself. The name of this role varies by state. It may be called a Durable Medical Power of  or Durable Power of  for Healthcare. It may be called an agent, surrogate, or advocate. Or it may be called a representative or decision maker. It is an official duty that is identified by a legal document. The document also varies by state.    Why Is Advance Care Planning Important?  If a person communicates their healthcare wishes:    They will be given medical care that matches their values and goals.    Their family members will not be forced to make decisions in a crisis with no guidance.  Creating a Plan  Making an advance care plan is often done in 3 steps:    Thinking about ones wishes. To create an advance care plan, you should think about what kind of medical treatment you would want if you lose the ability to communicate. Are there any situations in which you would refuse or stop treatment? Are there therapies you would want or not want? And whom do you want to make decisions for you? There are many places  to learn more about how to plan for your care. Ask your doctor or  for resources.    Picking a health care proxy. This means choosing a trusted person to speak for you only when you cant speak for yourself. When you cannot make medical decisions, your proxy makes sure the instructions in your advance care plan are followed. A proxy does not make decisions based on his or her own opinions. They must put aside those opinions and values if needed, and carry out your wishes.    Filling out the legal documents. There are several kinds of legal documents for advance care planning. Each one tells health care providers your wishes. The documents may vary by state. They must be signed and may need to be witnessed or notarized. You can cancel or change them whenever you wish. Depending on your state, the documents may include a Healthcare Proxy form, Living Will, Durable Medical Power of , Advance Directive, or others.  The Familys Role  The best help a family can give is to support their loved ones wishes. Open and honest communication is vital. Family should express any concerns they have about the patients choices while the patient can still make decisions.    9712-3457 [employer]. [address] [place] [address]. All rights reserved. This information is not intended as a substitute for professional medical care. Always follow your healthcare professional's instructions.         Also, [facility] offers a free, downloadable health care directive that allows you to share your treatment choices and personal preferences if you cannot communicate your wishes. It also allows you to appoint another person (called a health care agent) to make health care decisions if you are unable to do so. You can download an advance directive by going here: [email or web address]     Patient Education   Personalized Prevention Plan  You are due for the  preventive services outlined below.  Your care team is available to assist you in scheduling these services.  If you have already completed any of these items, please share that information with your care team to update in your medical record.  Health Maintenance   Topic Date Due   ? DEPRESSION ACTION PLAN  1945   ? ZOSTER VACCINES (2 of 3) 12/03/2012   ? MAMMOGRAM  10/14/2021   ? MEDICARE ANNUAL WELLNESS VISIT  12/01/2021   ? FALL RISK ASSESSMENT  12/01/2021   ? TD 18+ HE  01/25/2022   ? COLORECTAL CANCER SCREENING  09/30/2022   ? ADVANCE CARE PLANNING  10/25/2024   ? LIPID  10/16/2025   ? HEPATITIS C SCREENING  Completed   ? Pneumococcal Vaccine: 65+ Years  Completed   ? INFLUENZA VACCINE RULE BASED  Completed   ? Pneumococcal Vaccine: Pediatrics (0 to 5 Years) and At-Risk Patients (6 to 64 Years)  Aged Out   ? HEPATITIS B VACCINES  Aged Out

## 2021-06-23 NOTE — TELEPHONE ENCOUNTER
Refill Approved    Rx renewed per Medication Renewal Policy. Medication was last renewed on 12/25/2018- spoke with pharmacy they do not have this prescription on file and are needing a new prescription.    Kamryn Reeves, [facility] Connection Triage/Med Refill 1/14/2019     Requested Prescriptions   Pending Prescriptions Disp Refills     triamterene-hydrochlorothiazide (DYAZIDE) 37.5-25 mg per capsule 100 capsule 0     Sig: Take 1 capsule by mouth daily.    Diuretics/Combination Diuretics Refill Protocol  Passed - 1/14/2019  2:29 PM       Passed - Visit with PCP or prescribing provider visit in past 12 months    Last office visit with prescriber/PCP: 2/8/2018 Syed Adams MD OR same dept: 2/8/2018 Syed Adams MD OR same specialty: 2/8/2018 Syed Adams MD  Last physical: 6/7/2016 Last MTM visit: Visit date not found   Next visit within 3 mo: Visit date not found  Next physical within 3 mo: Visit date not found  Prescriber OR PCP: Syed Adams MD  Last diagnosis associated with med order: 1. Hypertension  - triamterene-hydrochlorothiazide (DYAZIDE) 37.5-25 mg per capsule; Take 1 capsule by mouth daily.  Dispense: 100 capsule; Refill: 0    If protocol passes may refill for 12 months if within 3 months of last provider visit (or a total of 15 months).            Passed - Serum Potassium in past 12 months     Lab Results   Component Value Date    Potassium 3.5 07/23/2018            Passed - Serum Sodium in past 12 months     Lab Results   Component Value Date    Sodium 135 (L) 07/23/2018            Passed - Blood pressure on file in past 12 months    BP Readings from Last 1 Encounters:   07/23/18 147/80            Passed - Serum Creatinine in past 12 months     Creatinine   Date Value Ref Range Status   07/23/2018 1.11 (H) 0.60 - 1.10 mg/dL Final

## 2021-06-23 NOTE — TELEPHONE ENCOUNTER
Refill Request  Did you contact pharmacy: Yes, Only gave patient 10 quantity last time and needs to change to 90 day supply to fill.      Medication name:   Requested Prescriptions     Pending Prescriptions Disp Refills     triamterene-hydrochlorothiazide (DYAZIDE) 37.5-25 mg per capsule 100 capsule 0     Sig: Take 1 capsule by mouth daily.     Who prescribed the medication: Dr Adams  Pharmacy Name and Location: [facility].    Is patient out of medication: Yes, pharmacy only gave patient 10 quantity last time and states need to change to a 90 day supply to fill.      Patient notified refills processed in 72 hours:  yes  Okay to leave a detailed message: yes

## 2021-06-23 NOTE — TELEPHONE ENCOUNTER
Prescription Monitoring Program activity reviewed with no discrepancies noted.  Last fill per : 10/22/2018    Controlled Substance Agreement on file: No  Date:     Last office visit with provider:  2/8/2018 Syed Adams MD    Please advise.

## 2021-06-23 NOTE — TELEPHONE ENCOUNTER
Controlled Substance Refill Request  Medication:   Requested Prescriptions     Pending Prescriptions Disp Refills     clonazePAM (KLONOPIN) 0.5 MG tablet [Pharmacy Med Name: CLONAZEPAM 0.5MG TABLETS] 50 tablet 0     Sig: TAKE 1 TABLET(0.5 MG) BY MOUTH TWICE DAILY AS NEEDED FOR ANXIETY     Date Last Fill: 10/22/18  Pharmacy:[id number]    Submit electronically to pharmacy  Controlled Substance Agreement on File:   Encounter-Level CSA Scan Date:    There are no encounter-level csa scan date.       Last office visit: Last office visit pertaining to requested medication was 2/8/18 .

## 2021-06-24 NOTE — TELEPHONE ENCOUNTER
Refill Given    Refill given per Policy, patient informed they are overdue for Physical     Mirlande Villela, [facility] Connection Triage/Med Refill 2/22/2019    Requested Prescriptions   Pending Prescriptions Disp Refills     citalopram (CELEXA) 40 MG tablet [Pharmacy Med Name: CITALOPRAM 40MG TABLETS] 100 tablet 0     Sig: TAKE 1 TABLET BY MOUTH EVERY DAY    SSRI Refill Protocol  Passed - 2/22/2019  2:24 PM       Passed - PCP or prescribing provider visit in last year    Last office visit with prescriber/PCP: 2/8/2018 Syed Adams MD OR same dept: 2/14/2019 David Patrick MD OR same specialty: 2/14/2019 David Patrick MD  Last physical: 6/7/2016 Last MTM visit: Visit date not found   Next visit within 3 mo: Visit date not found  Next physical within 3 mo: Visit date not found  Prescriber OR PCP: Syed Adams MD  Last diagnosis associated with med order: There are no diagnoses linked to this encounter.  If protocol passes may refill for 12 months if within 3 months of last provider visit (or a total of 15 months).

## 2021-06-24 NOTE — TELEPHONE ENCOUNTER
"Call from pt       CC:  \"Weird sensation for the past 2 months or so\"      Localized to L side that extends around the L shoulder region - from collar bone - under the arm, back the the scapula and then up behind the ear       \"Numbness\" , \"tingling\"   She endorses my description of it being similar to the sensation of a foot falling asleep     Not painful - \"just annoying\"       Spells occur 2-3 times / day and may only last for 15-20 seconds at a time     Does not recall injury or other obvious cause (lifting, fall) before they started      Able to lift L arm all the way - good  strength L hand     No nausea  No chest pain   No shortness of breath   No jaw pain     At home:   > Nothing at home so far - \"they go away before I can really do anything about them\"       A/P   > Appt for today   > Can consider warm compresses if would like     Rafita Silver RN   Triage and Medication Refills              "

## 2021-06-24 NOTE — TELEPHONE ENCOUNTER
Who is calling:  Ricardo,   Reason for Call:  Caller stated that patient is out of this medication.  Date of last appointment with primary care: 2/14  Has the patient been recently seen:  Yes  Okay to leave a detailed message: No

## 2021-06-24 NOTE — PROGRESS NOTES
"  Office Visit - Follow Up   Mariya Sorensen   74 y.o. female    Date of Visit: 2/14/2019    Chief Complaint   Patient presents with     Numbness     intermittent Lt shoulder numbness/tingling sensation - no recent falls/injuries         Assessment and Plan   1. DDD (degenerative disc disease), cervical  Her physical exam is normal.  I recommend heat followed by stretching exercises of her neck.  Follow-up with Dr. Adams if symptoms are progressive.  No imaging studies recommended at this time          No Follow-up on file.     History of Present Illness   This 74 y.o. old delightful woman who is worked at [employer] for the last 30 years.  She reports for the past 2 months that she is had some tingling in her left shoulder.  It comes and goes and lasts 10-30 seconds.  There is no associated pain.  There is no associated weakness in her left arm.  She is had no trauma to her shoulder or her neck.  It can happen up to 10 times per day but is always a very brief duration.  She is noticed no rash.    Review of Systems: A comprehensive review of systems was negative except as noted.     Medications, Allergies and Problem List   Reviewed, reconciled and updated     Physical Exam   General Appearance:       /80 (Patient Site: Left Arm, Patient Position: Sitting, Cuff Size: Adult Regular)   Pulse 90   Ht 4' 11\" (1.499 m)   Wt 135 lb (61.2 kg)   SpO2 96%   BMI 27.27 kg/m      Full range of motion about her neck.  Motor strength is entirely intact in her upper arms.  She has equal grasp.  Forearms and biceps and triceps have normal strength.  Deltoids have normal strength.  Cranial nerves III through XII are intact.  No gait abnormality.  Skin shows no rash.     Additional Information   Current Outpatient Medications   Medication Sig Dispense Refill     alendronate (FOSAMAX) 70 MG tablet TAKE 1 TABLET BY MOUTH WEEKLY. TAKE 30 MINUTES BEFORE FIRST FOOD WITH 8 OUNCES OF WATER; AVOID LYING DOWN FOR 30 " MINUTES. 12 tablet 0     aspirin 81 mg chewable tablet Chew 81 mg daily.       buPROPion (WELLBUTRIN SR) 150 MG 12 hr tablet TAKE 3 TABLETS BY MOUTH EVERY  tablet 3     CALCIUM-VITAMIN D3 ORAL Take 1 tablet by mouth daily.       cholecalciferol, vitamin D3, 1,000 unit tablet Take 1,000 Units by mouth daily.       citalopram (CELEXA) 40 MG tablet Take 1 tablet (40 mg total) by mouth daily. TAKE 1 TABLET BY MOUTH DAILY 90 tablet 12     clonazePAM (KLONOPIN) 0.5 MG tablet TAKE 1 TABLET(0.5 MG) BY MOUTH TWICE DAILY AS NEEDED FOR ANXIETY 50 tablet 0     cyanocobalamin (VITAMIN B-12) 100 MCG tablet Take 100 mcg by mouth daily.       losartan (COZAAR) 25 MG tablet Take 1 tablet (25 mg total) by mouth daily.  0     triamterene-hydrochlorothiazide (DYAZIDE) 37.5-25 mg per capsule Take 1 capsule by mouth daily. 90 capsule 0     vitamin E 400 UNIT capsule Take 400 Units by mouth daily.       No current facility-administered medications for this visit.      Allergies   Allergen Reactions     Penicillins Rash     Social History     Tobacco Use     Smoking status: Former Smoker     Smokeless tobacco: Never Used   Substance Use Topics     Alcohol use: Not on file     Drug use: Not on file       Review and/or order of clinical lab tests:  Review and/or order of radiology tests:  Review and/or order of medicine tests:  Discussion of test results with performing physician:  Decision to obtain old records and/or obtain history from someone other than the patient:  Review and summarization of old records and/or obtaining history from someone other than the patient and.or discussion of case with another health care provider:  Independent visualization of image, tracing or specimen itself:    Time: total time spent with the patient was 15 minutes of which >50% was spent in counseling and coordination of care     David Patrick MD

## 2021-06-25 NOTE — TELEPHONE ENCOUNTER
Refill Approved    Rx renewed per Medication Renewal Policy. Medication was last renewed on 12/24/18.    Fallon Guzman, [facility] Triage/Med Refill 3/19/2019     Requested Prescriptions   Pending Prescriptions Disp Refills     alendronate (FOSAMAX) 70 MG tablet [Pharmacy Med Name: ALENDRONATE 70MG TABLETS] 12 tablet 0     Sig: TAKE 1 TABLET BY MOUTH WEEKLY. TAKE 30 MINUTES BEFORE FIRST FOOD WITH 8 OUNCES OF WATER; AVOID LYING DOWN FOR 30 MINUTES.    Biphosphonates Refill Protocol Passed - 3/18/2019  3:48 AM       Passed - PCP or prescribing provider visit in last 12 months    Last office visit with prescriber/PCP: 2/8/2018 Syed Adams MD OR same dept: 2/14/2019 David Patrick MD OR same specialty: 2/14/2019 David Patrick MD  Last physical: 6/7/2016 Last MTM visit: Visit date not found   Next visit within 3 mo: Visit date not found  Next physical within 3 mo: Visit date not found  Prescriber OR PCP: Syed Adams MD  Last diagnosis associated with med order: There are no diagnoses linked to this encounter.  If protocol passes may refill for 12 months if within 3 months of last provider visit (or a total of 15 months).            Passed - Serum creatinine in last 12 months    Creatinine   Date Value Ref Range Status   07/23/2018 1.11 (H) 0.60 - 1.10 mg/dL Final

## 2021-10-03 ENCOUNTER — HEALTH MAINTENANCE LETTER (OUTPATIENT)
Age: 76
End: 2021-10-03

## 2022-01-23 ENCOUNTER — HEALTH MAINTENANCE LETTER (OUTPATIENT)
Age: 77
End: 2022-01-23

## 2022-09-04 ENCOUNTER — HEALTH MAINTENANCE LETTER (OUTPATIENT)
Age: 77
End: 2022-09-04

## 2023-04-30 ENCOUNTER — HEALTH MAINTENANCE LETTER (OUTPATIENT)
Age: 78
End: 2023-04-30

## 2024-04-17 NOTE — TELEPHONE ENCOUNTER
Please sign prescription placed in this encounter. Thank you.    Maame Serra, CMA     Attending Discharge Physical Examination: